# Patient Record
Sex: FEMALE | Race: WHITE | Employment: UNEMPLOYED | URBAN - METROPOLITAN AREA
[De-identification: names, ages, dates, MRNs, and addresses within clinical notes are randomized per-mention and may not be internally consistent; named-entity substitution may affect disease eponyms.]

---

## 2024-11-22 ENCOUNTER — APPOINTMENT (OUTPATIENT)
Dept: CT IMAGING | Age: 29
End: 2024-11-22
Payer: MEDICAID

## 2024-11-22 ENCOUNTER — HOSPITAL ENCOUNTER (OUTPATIENT)
Age: 29
Setting detail: OBSERVATION
Discharge: HOME | End: 2024-11-22
Attending: STUDENT IN AN ORGANIZED HEALTH CARE EDUCATION/TRAINING PROGRAM | Admitting: INTERNAL MEDICINE
Payer: MEDICAID

## 2024-11-22 VITALS
BODY MASS INDEX: 16.8 KG/M2 | HEIGHT: 71 IN | TEMPERATURE: 97.7 F | OXYGEN SATURATION: 97 % | WEIGHT: 120 LBS | RESPIRATION RATE: 16 BRPM | SYSTOLIC BLOOD PRESSURE: 96 MMHG | HEART RATE: 90 BPM | DIASTOLIC BLOOD PRESSURE: 74 MMHG

## 2024-11-22 DIAGNOSIS — R10.84 GENERALIZED ABDOMINAL PAIN: ICD-10-CM

## 2024-11-22 DIAGNOSIS — E87.6 HYPOKALEMIA: Primary | ICD-10-CM

## 2024-11-22 DIAGNOSIS — R11.0 NAUSEA: ICD-10-CM

## 2024-11-22 PROBLEM — F19.10 POLYSUBSTANCE ABUSE (HCC): Status: ACTIVE | Noted: 2024-11-22

## 2024-11-22 PROBLEM — F10.229 ACUTE ALCOHOL INTOXICATION WITH ALCOHOLISM (HCC): Status: ACTIVE | Noted: 2024-11-22

## 2024-11-22 PROBLEM — K58.9 IRRITABLE BOWEL SYNDROME: Status: ACTIVE | Noted: 2024-11-22

## 2024-11-22 LAB
ALBUMIN SERPL-MCNC: 4.5 G/DL (ref 3.5–5.2)
ALBUMIN/GLOB SERPL: 1.1 {RATIO} (ref 1–2.5)
ALP SERPL-CCNC: 96 U/L (ref 35–104)
ALT SERPL-CCNC: 8 U/L (ref 10–35)
AMPHET UR QL SCN: NEGATIVE
ANION GAP SERPL CALCULATED.3IONS-SCNC: 16 MMOL/L (ref 9–16)
AST SERPL-CCNC: 24 U/L (ref 10–35)
BARBITURATES UR QL SCN: NEGATIVE
BASOPHILS # BLD: 0.05 K/UL (ref 0–0.2)
BASOPHILS NFR BLD: 1 % (ref 0–2)
BENZODIAZ UR QL: NEGATIVE
BILIRUB SERPL-MCNC: 0.4 MG/DL (ref 0–1.2)
BILIRUB UR QL STRIP: NEGATIVE
BUN SERPL-MCNC: 7 MG/DL (ref 6–20)
CALCIUM SERPL-MCNC: 10.9 MG/DL (ref 8.6–10.4)
CANNABINOIDS UR QL SCN: POSITIVE
CHLORIDE SERPL-SCNC: 80 MMOL/L (ref 98–107)
CLARITY UR: ABNORMAL
CO2 SERPL-SCNC: 37 MMOL/L (ref 20–31)
COCAINE UR QL SCN: NEGATIVE
COLOR UR: YELLOW
CREAT SERPL-MCNC: 0.9 MG/DL (ref 0.5–0.9)
EOSINOPHIL # BLD: <0.03 K/UL (ref 0–0.44)
EOSINOPHILS RELATIVE PERCENT: 0 % (ref 1–4)
EPI CELLS #/AREA URNS HPF: NORMAL /HPF (ref 0–5)
ERYTHROCYTE [DISTWIDTH] IN BLOOD BY AUTOMATED COUNT: 13.8 % (ref 11.8–14.4)
ETHANOL PERCENT: 0.16 %
ETHANOLAMINE SERPL-MCNC: 163 MG/DL (ref 0–0.08)
FENTANYL UR QL: NEGATIVE
GFR, ESTIMATED: >90 ML/MIN/1.73M2
GLUCOSE SERPL-MCNC: 96 MG/DL (ref 74–99)
GLUCOSE UR STRIP-MCNC: NEGATIVE MG/DL
HCG SERPL QL: NEGATIVE
HCT VFR BLD AUTO: 42.7 % (ref 36.3–47.1)
HGB BLD-MCNC: 15.2 G/DL (ref 11.9–15.1)
HGB UR QL STRIP.AUTO: NEGATIVE
IMM GRANULOCYTES # BLD AUTO: 0.01 K/UL (ref 0–0.3)
IMM GRANULOCYTES NFR BLD: 0 %
KETONES UR STRIP-MCNC: NEGATIVE MG/DL
LEUKOCYTE ESTERASE UR QL STRIP: ABNORMAL
LIPASE SERPL-CCNC: 34 U/L (ref 13–60)
LYMPHOCYTES NFR BLD: 3.72 K/UL (ref 1.1–3.7)
LYMPHOCYTES RELATIVE PERCENT: 60 % (ref 24–43)
MAGNESIUM SERPL-MCNC: 2 MG/DL (ref 1.6–2.6)
MCH RBC QN AUTO: 29.7 PG (ref 25.2–33.5)
MCHC RBC AUTO-ENTMCNC: 35.6 G/DL (ref 28.4–34.8)
MCV RBC AUTO: 83.4 FL (ref 82.6–102.9)
METHADONE UR QL: NEGATIVE
MONOCYTES NFR BLD: 0.69 K/UL (ref 0.1–1.2)
MONOCYTES NFR BLD: 11 % (ref 3–12)
NEUTROPHILS NFR BLD: 28 % (ref 36–65)
NEUTS SEG NFR BLD: 1.7 K/UL (ref 1.5–8.1)
NITRITE UR QL STRIP: NEGATIVE
NRBC BLD-RTO: 0 PER 100 WBC
OPIATES UR QL SCN: NEGATIVE
OXYCODONE UR QL SCN: NEGATIVE
PCP UR QL SCN: NEGATIVE
PH UR STRIP: 7.5 [PH] (ref 5–8)
PLATELET # BLD AUTO: 360 K/UL (ref 138–453)
PMV BLD AUTO: 8.6 FL (ref 8.1–13.5)
POTASSIUM SERPL-SCNC: 2.3 MMOL/L (ref 3.7–5.3)
POTASSIUM SERPL-SCNC: 2.8 MMOL/L (ref 3.7–5.3)
PROT SERPL-MCNC: 8.6 G/DL (ref 6.6–8.7)
PROT UR STRIP-MCNC: NEGATIVE MG/DL
RBC # BLD AUTO: 5.12 M/UL (ref 3.95–5.11)
RBC #/AREA URNS HPF: NORMAL /HPF (ref 0–2)
SODIUM SERPL-SCNC: 133 MMOL/L (ref 136–145)
SP GR UR STRIP: 1.01 (ref 1–1.03)
TEST INFORMATION: ABNORMAL
UROBILINOGEN UR STRIP-ACNC: NORMAL EU/DL (ref 0–1)
WBC #/AREA URNS HPF: NORMAL /HPF (ref 0–5)
WBC OTHER # BLD: 6.2 K/UL (ref 3.5–11.3)

## 2024-11-22 PROCEDURE — APPSS45 APP SPLIT SHARED TIME 31-45 MINUTES

## 2024-11-22 PROCEDURE — 6370000000 HC RX 637 (ALT 250 FOR IP): Performed by: INTERNAL MEDICINE

## 2024-11-22 PROCEDURE — 6360000002 HC RX W HCPCS: Performed by: STUDENT IN AN ORGANIZED HEALTH CARE EDUCATION/TRAINING PROGRAM

## 2024-11-22 PROCEDURE — 80307 DRUG TEST PRSMV CHEM ANLYZR: CPT

## 2024-11-22 PROCEDURE — G0480 DRUG TEST DEF 1-7 CLASSES: HCPCS

## 2024-11-22 PROCEDURE — 99285 EMERGENCY DEPT VISIT HI MDM: CPT

## 2024-11-22 PROCEDURE — 84132 ASSAY OF SERUM POTASSIUM: CPT

## 2024-11-22 PROCEDURE — 96368 THER/DIAG CONCURRENT INF: CPT

## 2024-11-22 PROCEDURE — 99222 1ST HOSP IP/OBS MODERATE 55: CPT | Performed by: INTERNAL MEDICINE

## 2024-11-22 PROCEDURE — 96366 THER/PROPH/DIAG IV INF ADDON: CPT

## 2024-11-22 PROCEDURE — 96365 THER/PROPH/DIAG IV INF INIT: CPT

## 2024-11-22 PROCEDURE — G0378 HOSPITAL OBSERVATION PER HR: HCPCS

## 2024-11-22 PROCEDURE — 85025 COMPLETE CBC W/AUTO DIFF WBC: CPT

## 2024-11-22 PROCEDURE — 6360000002 HC RX W HCPCS

## 2024-11-22 PROCEDURE — 80053 COMPREHEN METABOLIC PANEL: CPT

## 2024-11-22 PROCEDURE — 81001 URINALYSIS AUTO W/SCOPE: CPT

## 2024-11-22 PROCEDURE — 2580000003 HC RX 258: Performed by: STUDENT IN AN ORGANIZED HEALTH CARE EDUCATION/TRAINING PROGRAM

## 2024-11-22 PROCEDURE — 96375 TX/PRO/DX INJ NEW DRUG ADDON: CPT

## 2024-11-22 PROCEDURE — 74176 CT ABD & PELVIS W/O CONTRAST: CPT

## 2024-11-22 PROCEDURE — 83735 ASSAY OF MAGNESIUM: CPT

## 2024-11-22 PROCEDURE — 36415 COLL VENOUS BLD VENIPUNCTURE: CPT

## 2024-11-22 PROCEDURE — 84703 CHORIONIC GONADOTROPIN ASSAY: CPT

## 2024-11-22 PROCEDURE — 83690 ASSAY OF LIPASE: CPT

## 2024-11-22 RX ORDER — MAGNESIUM SULFATE 1 G/100ML
1000 INJECTION INTRAVENOUS ONCE
Status: COMPLETED | OUTPATIENT
Start: 2024-11-22 | End: 2024-11-22

## 2024-11-22 RX ORDER — SODIUM CHLORIDE, SODIUM LACTATE, POTASSIUM CHLORIDE, AND CALCIUM CHLORIDE .6; .31; .03; .02 G/100ML; G/100ML; G/100ML; G/100ML
500 INJECTION, SOLUTION INTRAVENOUS ONCE
Status: COMPLETED | OUTPATIENT
Start: 2024-11-22 | End: 2024-11-22

## 2024-11-22 RX ORDER — MAGNESIUM SULFATE 1 G/100ML
1000 INJECTION INTRAVENOUS PRN
Status: DISCONTINUED | OUTPATIENT
Start: 2024-11-22 | End: 2024-11-22 | Stop reason: HOSPADM

## 2024-11-22 RX ORDER — METOCLOPRAMIDE HYDROCHLORIDE 5 MG/ML
10 INJECTION INTRAMUSCULAR; INTRAVENOUS ONCE
Status: COMPLETED | OUTPATIENT
Start: 2024-11-22 | End: 2024-11-22

## 2024-11-22 RX ORDER — FOLIC ACID 1 MG/1
1 TABLET ORAL DAILY
Status: DISCONTINUED | OUTPATIENT
Start: 2024-11-22 | End: 2024-11-22 | Stop reason: HOSPADM

## 2024-11-22 RX ORDER — POTASSIUM CHLORIDE 1500 MG/1
40 TABLET, EXTENDED RELEASE ORAL EVERY 4 HOURS
Status: COMPLETED | OUTPATIENT
Start: 2024-11-22 | End: 2024-11-22

## 2024-11-22 RX ORDER — SODIUM CHLORIDE AND POTASSIUM CHLORIDE 300; 900 MG/100ML; MG/100ML
INJECTION, SOLUTION INTRAVENOUS CONTINUOUS
Status: DISPENSED | OUTPATIENT
Start: 2024-11-22 | End: 2024-11-22

## 2024-11-22 RX ORDER — ENOXAPARIN SODIUM 100 MG/ML
40 INJECTION SUBCUTANEOUS DAILY
Status: DISCONTINUED | OUTPATIENT
Start: 2024-11-22 | End: 2024-11-22 | Stop reason: HOSPADM

## 2024-11-22 RX ORDER — FAMOTIDINE 20 MG/1
20 TABLET, FILM COATED ORAL 2 TIMES DAILY
COMMUNITY
Start: 2024-11-15 | End: 2025-11-15

## 2024-11-22 RX ORDER — DIPHENHYDRAMINE HYDROCHLORIDE 50 MG/ML
25 INJECTION INTRAMUSCULAR; INTRAVENOUS ONCE
Status: COMPLETED | OUTPATIENT
Start: 2024-11-22 | End: 2024-11-22

## 2024-11-22 RX ORDER — METOCLOPRAMIDE HYDROCHLORIDE 5 MG/ML
5 INJECTION INTRAMUSCULAR; INTRAVENOUS EVERY 6 HOURS PRN
Status: DISCONTINUED | OUTPATIENT
Start: 2024-11-22 | End: 2024-11-22 | Stop reason: HOSPADM

## 2024-11-22 RX ORDER — POTASSIUM CHLORIDE 1500 MG/1
20 TABLET, EXTENDED RELEASE ORAL 2 TIMES DAILY
COMMUNITY

## 2024-11-22 RX ORDER — SODIUM CHLORIDE AND POTASSIUM CHLORIDE 300; 900 MG/100ML; MG/100ML
INJECTION, SOLUTION INTRAVENOUS CONTINUOUS
Status: DISCONTINUED | OUTPATIENT
Start: 2024-11-22 | End: 2024-11-22 | Stop reason: HOSPADM

## 2024-11-22 RX ORDER — POLYETHYLENE GLYCOL 3350 17 G/17G
17 POWDER, FOR SOLUTION ORAL DAILY PRN
Status: DISCONTINUED | OUTPATIENT
Start: 2024-11-22 | End: 2024-11-22 | Stop reason: HOSPADM

## 2024-11-22 RX ORDER — ACETAMINOPHEN 650 MG/1
650 SUPPOSITORY RECTAL EVERY 6 HOURS PRN
Status: DISCONTINUED | OUTPATIENT
Start: 2024-11-22 | End: 2024-11-22 | Stop reason: HOSPADM

## 2024-11-22 RX ORDER — ONDANSETRON 4 MG/1
4 TABLET, ORALLY DISINTEGRATING ORAL EVERY 8 HOURS PRN
Status: DISCONTINUED | OUTPATIENT
Start: 2024-11-22 | End: 2024-11-22 | Stop reason: HOSPADM

## 2024-11-22 RX ORDER — M-VIT,TX,IRON,MINS/CALC/FOLIC 27MG-0.4MG
1 TABLET ORAL DAILY
COMMUNITY
Start: 2024-11-22 | End: 2025-11-22

## 2024-11-22 RX ORDER — ONDANSETRON 2 MG/ML
4 INJECTION INTRAMUSCULAR; INTRAVENOUS EVERY 6 HOURS PRN
Status: DISCONTINUED | OUTPATIENT
Start: 2024-11-22 | End: 2024-11-22 | Stop reason: HOSPADM

## 2024-11-22 RX ORDER — OLANZAPINE 5 MG/1
5 TABLET ORAL NIGHTLY
COMMUNITY
Start: 2024-11-15 | End: 2025-11-15

## 2024-11-22 RX ORDER — IOPAMIDOL 755 MG/ML
75 INJECTION, SOLUTION INTRAVASCULAR
Status: DISCONTINUED | OUTPATIENT
Start: 2024-11-22 | End: 2024-11-22 | Stop reason: HOSPADM

## 2024-11-22 RX ORDER — SODIUM CHLORIDE 0.9 % (FLUSH) 0.9 %
10 SYRINGE (ML) INJECTION PRN
Status: DISCONTINUED | OUTPATIENT
Start: 2024-11-22 | End: 2024-11-22 | Stop reason: HOSPADM

## 2024-11-22 RX ORDER — 0.9 % SODIUM CHLORIDE 0.9 %
80 INTRAVENOUS SOLUTION INTRAVENOUS ONCE
Status: DISCONTINUED | OUTPATIENT
Start: 2024-11-22 | End: 2024-11-22 | Stop reason: HOSPADM

## 2024-11-22 RX ORDER — ACETAMINOPHEN 325 MG/1
650 TABLET ORAL EVERY 6 HOURS PRN
Status: DISCONTINUED | OUTPATIENT
Start: 2024-11-22 | End: 2024-11-22 | Stop reason: HOSPADM

## 2024-11-22 RX ORDER — SODIUM CHLORIDE 0.9 % (FLUSH) 0.9 %
5-40 SYRINGE (ML) INJECTION EVERY 12 HOURS SCHEDULED
Status: DISCONTINUED | OUTPATIENT
Start: 2024-11-22 | End: 2024-11-22 | Stop reason: HOSPADM

## 2024-11-22 RX ORDER — GAUZE BANDAGE 2" X 2"
100 BANDAGE TOPICAL DAILY
Status: DISCONTINUED | OUTPATIENT
Start: 2024-11-22 | End: 2024-11-22 | Stop reason: HOSPADM

## 2024-11-22 RX ORDER — SODIUM CHLORIDE 9 MG/ML
INJECTION, SOLUTION INTRAVENOUS PRN
Status: DISCONTINUED | OUTPATIENT
Start: 2024-11-22 | End: 2024-11-22 | Stop reason: HOSPADM

## 2024-11-22 RX ORDER — MAGNESIUM OXIDE 400 MG/1
400 TABLET ORAL DAILY
COMMUNITY
Start: 2024-11-15

## 2024-11-22 RX ADMIN — POTASSIUM CHLORIDE 40 MEQ: 1500 TABLET, EXTENDED RELEASE ORAL at 13:56

## 2024-11-22 RX ADMIN — MAGNESIUM SULFATE HEPTAHYDRATE 1000 MG: 1 INJECTION, SOLUTION INTRAVENOUS at 03:33

## 2024-11-22 RX ADMIN — METOCLOPRAMIDE 10 MG: 5 INJECTION, SOLUTION INTRAMUSCULAR; INTRAVENOUS at 02:26

## 2024-11-22 RX ADMIN — FOLIC ACID 1 MG: 1 TABLET ORAL at 13:55

## 2024-11-22 RX ADMIN — POTASSIUM CHLORIDE AND SODIUM CHLORIDE: 900; 300 INJECTION, SOLUTION INTRAVENOUS at 03:32

## 2024-11-22 RX ADMIN — POTASSIUM CHLORIDE AND SODIUM CHLORIDE: 900; 300 INJECTION, SOLUTION INTRAVENOUS at 09:38

## 2024-11-22 RX ADMIN — DIPHENHYDRAMINE HYDROCHLORIDE 25 MG: 50 INJECTION INTRAMUSCULAR; INTRAVENOUS at 02:26

## 2024-11-22 RX ADMIN — Medication 100 MG: at 13:55

## 2024-11-22 RX ADMIN — POTASSIUM CHLORIDE 40 MEQ: 1500 TABLET, EXTENDED RELEASE ORAL at 10:26

## 2024-11-22 RX ADMIN — SODIUM CHLORIDE, POTASSIUM CHLORIDE, SODIUM LACTATE AND CALCIUM CHLORIDE 500 ML: 600; 310; 30; 20 INJECTION, SOLUTION INTRAVENOUS at 06:16

## 2024-11-22 ASSESSMENT — PAIN SCALES - GENERAL
PAINLEVEL_OUTOF10: 8
PAINLEVEL_OUTOF10: 0
PAINLEVEL_OUTOF10: 2

## 2024-11-22 ASSESSMENT — ENCOUNTER SYMPTOMS
NAUSEA: 1
SHORTNESS OF BREATH: 0
ABDOMINAL PAIN: 1
WHEEZING: 0
CONSTIPATION: 0
VOMITING: 0

## 2024-11-22 ASSESSMENT — PAIN - FUNCTIONAL ASSESSMENT: PAIN_FUNCTIONAL_ASSESSMENT: 0-10

## 2024-11-22 NOTE — PROGRESS NOTES
Patient admitted to room 1009  VS taken, telemetry placed and call light given/within reach. Patient A&O and given room orientation. Orders released/reviewed, initial assessment completed and navigator started. See chart for more detail.

## 2024-11-22 NOTE — PROGRESS NOTES
Patient called out at 1500 and stated she would like to be discharged. Writer informed patient that her potassium was critically low and next lab draw would be at 1600. Patient expressed that she and her significant other would not like to wait. Writer explained if she left it would be against medical advice. Patient expressed understanding and signed AMA paperwork    1600- Dr Mejía to bedside, after discussion with patient discharge order in place. Patient discharged, picked up via [employer]

## 2024-11-22 NOTE — ED PROVIDER NOTES
admission.    DATA FOR LAB AND RADIOLOGY TESTS ORDERED BELOW ARE REVIEWED BY THE ED CLINICIAN:    RADIOLOGY: All x-rays, CT, MRI, and formal ultrasound images (except ED bedside ultrasound) are read by the radiologist, see reports below, unless otherwise noted in MDM or here.  Reports below are reviewed by myself.  CT ABDOMEN PELVIS WO CONTRAST Additional Contrast? None   Final Result   No acute inflammatory process identified in the abdomen or pelvis.             LABS: Lab orders shown below, the results are reviewed by myself, and all abnormals are listed below.  Labs Reviewed   COMPREHENSIVE METABOLIC PANEL - Abnormal; Notable for the following components:       Result Value    Sodium 133 (*)     Potassium 2.3 (*)     Chloride 80 (*)     CO2 37 (*)     Calcium 10.9 (*)     ALT 8 (*)     All other components within normal limits   ETHANOL - Abnormal; Notable for the following components:    Ethanol Lvl 163 (*)     All other components within normal limits   CBC WITH AUTO DIFFERENTIAL - Abnormal; Notable for the following components:    RBC 5.12 (*)     Hemoglobin 15.2 (*)     MCHC 35.6 (*)     Neutrophils % 28 (*)     Lymphocytes % 60 (*)     Eosinophils % 0 (*)     Lymphocytes Absolute 3.72 (*)     All other components within normal limits   URINE DRUG SCREEN - Abnormal; Notable for the following components:    Cannabinoid Scrn, Ur POSITIVE (*)     All other components within normal limits   URINALYSIS WITH REFLEX TO CULTURE - Abnormal; Notable for the following components:    Turbidity UA SLIGHTLY CLOUDY (*)     Leukocyte Esterase, Urine TRACE (*)     All other components within normal limits   LIPASE   HCG, SERUM, QUALITATIVE   MAGNESIUM   MICROSCOPIC URINALYSIS   POTASSIUM       Vitals Reviewed:    Vitals:    11/22/24 0433 11/22/24 0503 11/22/24 0704 11/22/24 0730   BP:   (!) 88/67 108/74   Pulse:    69   Resp:    16   Temp:    98.6 °F (37 °C)   TempSrc:    Oral   SpO2: 96% 98% 95% 97%   Weight:       Height:          MEDICATIONS GIVEN TO PATIENT THIS ENCOUNTER:  Orders Placed This Encounter   Medications    metoclopramide (REGLAN) injection 10 mg    diphenhydrAMINE (BENADRYL) injection 25 mg    0.9% NaCl with KCl 40 mEq infusion    magnesium sulfate 1000 mg in dextrose 5% 100 mL IVPB    iopamidol (ISOVUE-370) 76 % injection 75 mL    sodium chloride flush 0.9 % injection 10 mL    sodium chloride 0.9 % bolus 80 mL    sodium chloride flush 0.9 % injection 5-40 mL    sodium chloride flush 0.9 % injection 10 mL    0.9 % sodium chloride infusion    magnesium sulfate 1000 mg in dextrose 5% 100 mL IVPB    enoxaparin (LOVENOX) injection 40 mg     Order Specific Question:   Indication of Use     Answer:   Prophylaxis-DVT/PE    OR Linked Order Group     ondansetron (ZOFRAN-ODT) disintegrating tablet 4 mg     ondansetron (ZOFRAN) injection 4 mg    polyethylene glycol (GLYCOLAX) packet 17 g    OR Linked Order Group     acetaminophen (TYLENOL) tablet 650 mg     acetaminophen (TYLENOL) suppository 650 mg    0.9% NaCl with KCl 40 mEq infusion    metoclopramide (REGLAN) injection 5 mg    lactated ringers bolus 500 mL     DISCHARGE PRESCRIPTIONS:  Current Discharge Medication List        PHYSICIAN CONSULTS ORDERED THIS ENCOUNTER:  IP CONSULT TO HOSPITALIST    ED Course Notes From Epic Narrator:  ED Course as of 11/22/24 0823 Fri Nov 22, 2024   0250 RDW: 13.8 [MS]   0311 Potassium(!!): 2.3 [MS]      ED Course User Index  [MS] Gokul Salomon DO         CRITICAL CARE:   Due to the high probability of sudden and clinically significant deterioration in the patient's condition patient required highest level of my preparedness to intervene urgently. I provided critical care time including documentation time, medication orders and management, reevaluation, vital sign assessment, ordering and reviewing of of lab tests ordering and reviewing of x-ray studies, and admission orders. Aggregate critical care time is 30 minutes including only time

## 2024-11-22 NOTE — CARE COORDINATION
Case Management Assessment  Initial Evaluation    Date/Time of Evaluation: 11/22/2024 1:19 PM  Assessment Completed by: CARO LEMUS RN    If patient is discharged prior to next notation, then this note serves as note for discharge by case management.    Patient Name: Ese Harding                   YOB: 1995  Diagnosis: Hypokalemia [E87.6]  Nausea [R11.0]  Generalized abdominal pain [R10.84]                   Date / Time: 11/22/2024  2:14 AM    Patient Admission Status: Observation   Readmission Risk (Low < 19, Mod (19-27), High > 27): No data recorded  Current PCP: Wale Zhang MD  PCP verified by CM? Yes    Chart Reviewed: Yes      History Provided by: Patient  Patient Orientation: Alert and Oriented    Patient Cognition: Alert    Hospitalization in the last 30 days (Readmission):  No    If yes, Readmission Assessment in  Navigator will be completed.    Advance Directives:      Code Status: Full Code   Patient's Primary Decision Maker is: Legal Next of Kin      Discharge Planning:    Patient lives with: Spouse/Significant Other Type of Home: House  Primary Care Giver: Self  Patient Support Systems include: Spouse/Significant Other   Current Financial resources: [place] ([place])  Current community resources: [facility]  Current services prior to admission: None            Current DME:              Type of Home Care services:  None    ADLS  Prior functional level: Independent in ADLs/IADLs  Current functional level: Independent in ADLs/IADLs    PT AM-PAC:   /24  OT AM-PAC:   /24    Family can provide assistance at DC: Yes  Would you like Case Management to discuss the discharge plan with any other family members/significant others, and if so, who? Yes  Plans to Return to Present Housing: Yes  Other Identified Issues/Barriers to RETURNING to current housing: labs better   Potential Assistance needed at discharge: N/A            Potential DME:    Patient expects to discharge to:

## 2024-11-22 NOTE — H&P
[facility]  Office: [phone]  Ignacio Marc DO, Howard Jaquez DO, Frank Mejía DO, Néstor Rizvi DO, Davina Ling MD, Melody Andrade MD, Elayne Alves MD, Pooja Younger MD,  Richard Rendon MD, Ousmane Almanza MD, Christiana Webb MD,  Elizabeth Cardona DO, Polina Hoskins MD, Shane Carranza MD, Tung Marc DO, Dominique Lowery MD,  Walter Benjamin DO, Damaris Dumont MD, Tara Verde MD, Keke Wu MD, Tayo Knox MD,  Tano Martinez MD, Cyndee Trivedi MD, Lilliam Reyes MD, Devon Michelle MD, Ty Martin MD, Alessandro Zaragoza MD, Griffin Horton DO, Ethan Rowe DO, Kelsie Samayoa MD,  Adrian Garcia MD, Shirley Waterhouse, CNP,  Lorrie Porter, CNP, Juwan Pfeiffer, CNP,  Betty Couch, KITTY, Afsaneh Trujillo, CNP, Senait Sena, CNP, Dena Stevens CNP, Sharri Campbell CNP, Shawanda Montejo, CNP, Darleen Mccauley, PA-C, Mariana Hemphill PA-C, Sophie Ponce, CNP, Brie Nunn, CNP, Elaine Cheatham, CNP, Johanna Sanders, CNS, Alesia Coto, CNP, Marisol Alicea, CNP, Tracy Schwab, CNP         [facility]   IN-PATIENT SERVICE   [facility]    HISTORY AND PHYSICAL EXAMINATION            Date:   11/22/2024  Patient name:  Ese Harding  Date of admission:  11/22/2024  2:14 AM  MRN:   [id number]  Account:  [id number]  YOB: 1995  PCP:    No primary care provider on file.  Room:   [place] [address]  Code Status:    No Order    Chief Complaint:     Chief Complaint   Patient presents with    Abdominal Pain    Vomiting       History Obtained From:     patient, electronic medical record    History of Present Illness:     Ese Harding is a 29 y.o. Non- of /a female who presents with Abdominal Pain and Vomiting   and is admitted to the hospital for the management of Hypokalemia.    Patient presents emergency department with complaints of abdominal pain and vomiting.  She does have a past medical history significant for irritable bowel syndrome, hypokalemia,  BMI 16.74 kg/m²   No data recorded.    No results for input(s): \"POCGLU\" in the last 72 hours.  No intake or output data in the 24 hours ending 11/22/24 0427    Physical Exam  Constitutional:       General: She is sleeping.      Comments: Intoxicated   Cardiovascular:      Rate and Rhythm: Normal rate and regular rhythm.      Heart sounds: Normal heart sounds.   Pulmonary:      Effort: Pulmonary effort is normal.      Breath sounds: Normal breath sounds and air entry.   Abdominal:      Palpations: Abdomen is soft.      Tenderness: There is no abdominal tenderness.   Musculoskeletal:      Right lower leg: No edema.      Left lower leg: No edema.   Skin:     General: Skin is warm and dry.      Capillary Refill: Capillary refill takes 2 to 3 seconds.   Neurological:      Mental Status: She is oriented to person, place, and time and easily aroused.   Psychiatric:         Mood and Affect: Mood normal.         Behavior: Behavior is cooperative.         Investigations:      Laboratory Testing:  Recent Results (from the past 24 hour(s))   Comprehensive Metabolic Panel    Collection Time: 11/22/24  2:22 AM   Result Value Ref Range    Sodium 133 (L) 136 - 145 mmol/L    Potassium 2.3 (LL) 3.7 - 5.3 mmol/L    Chloride 80 (L) 98 - 107 mmol/L    CO2 37 (H) 20 - 31 mmol/L    Anion Gap 16 9 - 16 mmol/L    Glucose 96 74 - 99 mg/dL    BUN 7 6 - 20 mg/dL    Creatinine 0.9 0.50 - 0.90 mg/dL    Est, Glom Filt Rate >90 >60 mL/min/1.73m2    Calcium 10.9 (H) 8.6 - 10.4 mg/dL    Total Protein 8.6 6.6 - 8.7 g/dL    Albumin 4.5 3.5 - 5.2 g/dL    Albumin/Globulin Ratio 1.1 1.0 - 2.5    Total Bilirubin 0.4 0.00 - 1.20 mg/dL    Alkaline Phosphatase 96 35 - 104 U/L    ALT 8 (L) 10 - 35 U/L    AST 24 10 - 35 U/L   Lipase    Collection Time: 11/22/24  2:22 AM   Result Value Ref Range    Lipase 34 13 - 60 U/L   Ethanol    Collection Time: 11/22/24  2:22 AM   Result Value Ref Range    Ethanol Lvl 163 (H) <10 mg/dL    Ethanol percent 0.163 %   CBC

## 2024-11-22 NOTE — PROGRESS NOTES
[x] Medication Reconciliation was completed and the patient's home medication list was verified. The Med List Status is \"Complete\". The following sources were used to assist with Medication Reconciliation:    [] Med Rec Pharmacist already completed   [x] Patient had a list of medications which was transcribed into the EHR.  [] Patient provided bottles of their medications  [] Home medications reviewed and confirmed with   [] Contacted patient's pharmacy to confirm home medications  [] Contacted patient's physician office to confirm home medications  [x] Medical Records from another facility and/or Care Everywhere were reviewed  [] MAR from facility requested to be faxed over  [] Unable to complete due to patient condition  [] Unable to validate home medications      [] There are one or more home medications that need clarification before Medication Reconciliation can be completed. The Med List Status has been marked as In Progress.     To assist with Home Medication Reconciliation the following actions have been taken:    [] Pharmacy medication reconciliation service requested. (Note: This can be done by sending a Perfect Serve message to The Med Rec Pharmacist or by phoning [phone].)  [] Family requested to bring medications into the hospital  [] Family requested to call hospital with medication list  [] Message left with physician office  [] Request for medical records made to   [] Other

## 2024-11-22 NOTE — DISCHARGE SUMMARY
of abdominal pain with intractable vomiting.  Upon evaluation she is found to have marked hypokalemia the potassium of 2.3 and mild dehydration.  She is admitted and placed on IV fluids and underwent aggressive potassium supplementation.  On the date of the 22nd and her diet was advanced and tolerated and subsequently she was able to be discharged home after completion of her oral potassium supplementation.      Significant therapeutic interventions: As above    Significant Diagnostic Studies:   Labs / Micro:  CBC:   Lab Results   Component Value Date/Time    WBC 6.2 11/22/2024 02:22 AM    RBC 5.12 11/22/2024 02:22 AM    HGB 15.2 11/22/2024 02:22 AM    HCT 42.7 11/22/2024 02:22 AM    MCV 83.4 11/22/2024 02:22 AM    MCH 29.7 11/22/2024 02:22 AM    MCHC 35.6 11/22/2024 02:22 AM    RDW 13.8 11/22/2024 02:22 AM     11/22/2024 02:22 AM     BMP:    Lab Results   Component Value Date/Time    GLUCOSE 96 11/22/2024 02:22 AM     11/22/2024 02:22 AM    K 2.8 11/22/2024 08:56 AM    CL 80 11/22/2024 02:22 AM    CO2 37 11/22/2024 02:22 AM    ANIONGAP 16 11/22/2024 02:22 AM    BUN 7 11/22/2024 02:22 AM    CREATININE 0.9 11/22/2024 02:22 AM    CALCIUM 10.9 11/22/2024 02:22 AM    LABGLOM >90 11/22/2024 02:22 AM        Radiology:  CT ABDOMEN PELVIS WO CONTRAST Additional Contrast? None    Result Date: 11/22/2024  No acute inflammatory process identified in the abdomen or pelvis.       Consultations:    Consults:     Final Specialist Recommendations/Findings:   IP CONSULT TO HOSPITALIST      The patient was seen and examined on day of discharge and this discharge summary is in conjunction with any daily progress note from day of discharge.    Discharge plan:     Disposition: Home    Physician Follow Up:   PCP 1 week  No follow-up provider specified.     Requiring Further Evaluation/Follow Up POST HOSPITALIZATION/Incidental Findings: Follow-up labs at discretion of PCP    Diet: regular diet    Activity: As  tolerated    Instructions to Patient: Take medication as prescribed    Discharge Medications:      Medication List        START taking these medications      therapeutic multivitamin-minerals tablet  Take 1 tablet by mouth daily            CONTINUE taking these medications      famotidine 20 MG tablet  Commonly known as: PEPCID     linaclotide 145 MCG capsule  Commonly known as: LINZESS     magnesium oxide 400 MG tablet  Commonly known as: MAG-OX     OLANZapine 5 MG tablet  Commonly known as: ZYPREXA     omeprazole 20 MG delayed release capsule  Commonly known as: PRILOSEC     potassium chloride 20 MEQ extended release tablet  Commonly known as: KLOR-CON M               Where to Get Your Medications        You can get these medications from any pharmacy    You don't need a prescription for these medications  therapeutic multivitamin-minerals tablet         No discharge procedures on file.    Time Spent on discharge is   26 minutes  in patient examination, evaluation, counseling as well as medication reconciliation, prescriptions for required medications, discharge plan and follow up.    Electronically signed by   Frank Mejía DO  11/22/2024  4:04 PM      Thank you Wale Lacy MD for the opportunity to be involved in this patient's care.

## 2025-01-01 ENCOUNTER — HOSPITAL ENCOUNTER (EMERGENCY)
Age: 30
Discharge: HOME | End: 2025-01-02
Attending: STUDENT IN AN ORGANIZED HEALTH CARE EDUCATION/TRAINING PROGRAM
Payer: MEDICAID

## 2025-01-01 DIAGNOSIS — F10.920 ACUTE ALCOHOLIC INTOXICATION WITHOUT COMPLICATION (HCC): ICD-10-CM

## 2025-01-01 DIAGNOSIS — N30.00 ACUTE CYSTITIS WITHOUT HEMATURIA: Primary | ICD-10-CM

## 2025-01-01 PROCEDURE — 85025 COMPLETE CBC W/AUTO DIFF WBC: CPT

## 2025-01-01 PROCEDURE — 99284 EMERGENCY DEPT VISIT MOD MDM: CPT

## 2025-01-01 PROCEDURE — 84484 ASSAY OF TROPONIN QUANT: CPT

## 2025-01-01 PROCEDURE — 96374 THER/PROPH/DIAG INJ IV PUSH: CPT

## 2025-01-01 PROCEDURE — 80048 BASIC METABOLIC PNL TOTAL CA: CPT

## 2025-01-01 PROCEDURE — G0480 DRUG TEST DEF 1-7 CLASSES: HCPCS

## 2025-01-01 PROCEDURE — 6360000002 HC RX W HCPCS: Performed by: STUDENT IN AN ORGANIZED HEALTH CARE EDUCATION/TRAINING PROGRAM

## 2025-01-01 PROCEDURE — 93005 ELECTROCARDIOGRAM TRACING: CPT | Performed by: STUDENT IN AN ORGANIZED HEALTH CARE EDUCATION/TRAINING PROGRAM

## 2025-01-01 RX ORDER — ONDANSETRON 2 MG/ML
4 INJECTION INTRAMUSCULAR; INTRAVENOUS ONCE
Status: COMPLETED | OUTPATIENT
Start: 2025-01-01 | End: 2025-01-01

## 2025-01-01 RX ADMIN — ONDANSETRON 4 MG: 2 INJECTION, SOLUTION INTRAMUSCULAR; INTRAVENOUS at 23:31

## 2025-01-01 ASSESSMENT — PAIN - FUNCTIONAL ASSESSMENT: PAIN_FUNCTIONAL_ASSESSMENT: 0-10

## 2025-01-01 ASSESSMENT — PAIN DESCRIPTION - LOCATION: LOCATION: CHEST

## 2025-01-01 ASSESSMENT — PAIN SCALES - GENERAL: PAINLEVEL_OUTOF10: 7

## 2025-01-02 ENCOUNTER — HOSPITAL ENCOUNTER (EMERGENCY)
Age: 30
Discharge: LEFT BEFORE DISCHARGE | End: 2025-01-02

## 2025-01-02 VITALS
DIASTOLIC BLOOD PRESSURE: 86 MMHG | RESPIRATION RATE: 29 BRPM | BODY MASS INDEX: 15.4 KG/M2 | TEMPERATURE: 98.2 F | OXYGEN SATURATION: 98 % | HEART RATE: 104 BPM | HEIGHT: 71 IN | WEIGHT: 110 LBS | SYSTOLIC BLOOD PRESSURE: 110 MMHG

## 2025-01-02 VITALS
SYSTOLIC BLOOD PRESSURE: 101 MMHG | OXYGEN SATURATION: 97 % | TEMPERATURE: 98.2 F | RESPIRATION RATE: 18 BRPM | DIASTOLIC BLOOD PRESSURE: 72 MMHG | WEIGHT: 110 LBS | BODY MASS INDEX: 15.4 KG/M2 | HEIGHT: 71 IN | HEART RATE: 122 BPM

## 2025-01-02 LAB
AMPHET UR QL SCN: NEGATIVE
ANION GAP SERPL CALCULATED.3IONS-SCNC: 17 MMOL/L (ref 9–16)
BACTERIA URNS QL MICRO: ABNORMAL
BARBITURATES UR QL SCN: NEGATIVE
BASOPHILS # BLD: <0.03 K/UL (ref 0–0.2)
BASOPHILS NFR BLD: 0 % (ref 0–2)
BENZODIAZ UR QL: POSITIVE
BILIRUB UR QL STRIP: NEGATIVE
BUN SERPL-MCNC: 11 MG/DL (ref 6–20)
CALCIUM SERPL-MCNC: 8.7 MG/DL (ref 8.6–10.4)
CANNABINOIDS UR QL SCN: POSITIVE
CHLORIDE SERPL-SCNC: 100 MMOL/L (ref 98–107)
CLARITY UR: ABNORMAL
CO2 SERPL-SCNC: 26 MMOL/L (ref 20–31)
COCAINE UR QL SCN: NEGATIVE
COLOR UR: YELLOW
CREAT SERPL-MCNC: 0.6 MG/DL (ref 0.5–0.9)
EKG ATRIAL RATE: 103 BPM
EKG P AXIS: 75 DEGREES
EKG P-R INTERVAL: 134 MS
EKG Q-T INTERVAL: 350 MS
EKG QRS DURATION: 88 MS
EKG QTC CALCULATION (BAZETT): 458 MS
EKG R AXIS: 73 DEGREES
EKG VENTRICULAR RATE: 103 BPM
EOSINOPHIL # BLD: 0.04 K/UL (ref 0–0.44)
EOSINOPHILS RELATIVE PERCENT: 1 % (ref 1–4)
EPI CELLS #/AREA URNS HPF: ABNORMAL /HPF (ref 0–5)
ERYTHROCYTE [DISTWIDTH] IN BLOOD BY AUTOMATED COUNT: 14.9 % (ref 11.8–14.4)
ETHANOL PERCENT: 0.26 %
ETHANOLAMINE SERPL-MCNC: 261 MG/DL (ref 0–0.08)
FENTANYL UR QL: NEGATIVE
GFR, ESTIMATED: >90 ML/MIN/1.73M2
GLUCOSE SERPL-MCNC: 110 MG/DL (ref 74–99)
GLUCOSE UR STRIP-MCNC: NEGATIVE MG/DL
HCG UR QL: NEGATIVE
HCT VFR BLD AUTO: 36.4 % (ref 36.3–47.1)
HGB BLD-MCNC: 12.9 G/DL (ref 11.9–15.1)
HGB UR QL STRIP.AUTO: ABNORMAL
IMM GRANULOCYTES # BLD AUTO: 0.03 K/UL (ref 0–0.3)
IMM GRANULOCYTES NFR BLD: 0 %
KETONES UR STRIP-MCNC: NEGATIVE MG/DL
LEUKOCYTE ESTERASE UR QL STRIP: ABNORMAL
LYMPHOCYTES NFR BLD: 2.54 K/UL (ref 1.1–3.7)
LYMPHOCYTES RELATIVE PERCENT: 29 % (ref 24–43)
MCH RBC QN AUTO: 31.4 PG (ref 25.2–33.5)
MCHC RBC AUTO-ENTMCNC: 35.4 G/DL (ref 28.4–34.8)
MCV RBC AUTO: 88.6 FL (ref 82.6–102.9)
METHADONE UR QL: NEGATIVE
MONOCYTES NFR BLD: 0.71 K/UL (ref 0.1–1.2)
MONOCYTES NFR BLD: 8 % (ref 3–12)
NEUTROPHILS NFR BLD: 62 % (ref 36–65)
NEUTS SEG NFR BLD: 5.45 K/UL (ref 1.5–8.1)
NITRITE UR QL STRIP: NEGATIVE
NRBC BLD-RTO: 0 PER 100 WBC
OPIATES UR QL SCN: NEGATIVE
OXYCODONE UR QL SCN: NEGATIVE
PCP UR QL SCN: NEGATIVE
PH UR STRIP: 8 [PH] (ref 5–8)
PLATELET # BLD AUTO: 206 K/UL (ref 138–453)
PMV BLD AUTO: 8.9 FL (ref 8.1–13.5)
POTASSIUM SERPL-SCNC: 3.4 MMOL/L (ref 3.7–5.3)
PROT UR STRIP-MCNC: ABNORMAL MG/DL
RBC # BLD AUTO: 4.11 M/UL (ref 3.95–5.11)
RBC # BLD: ABNORMAL 10*6/UL
RBC #/AREA URNS HPF: ABNORMAL /HPF (ref 0–2)
SODIUM SERPL-SCNC: 143 MMOL/L (ref 136–145)
SP GR UR STRIP: 1.02 (ref 1–1.03)
TROPONIN I SERPL HS-MCNC: <6 NG/L (ref 0–14)
UROBILINOGEN UR STRIP-ACNC: NORMAL EU/DL (ref 0–1)
WBC #/AREA URNS HPF: ABNORMAL /HPF (ref 0–5)
WBC OTHER # BLD: 8.8 K/UL (ref 3.5–11.3)

## 2025-01-02 PROCEDURE — 81001 URINALYSIS AUTO W/SCOPE: CPT

## 2025-01-02 PROCEDURE — 87186 SC STD MICRODIL/AGAR DIL: CPT

## 2025-01-02 PROCEDURE — 87086 URINE CULTURE/COLONY COUNT: CPT

## 2025-01-02 PROCEDURE — 80307 DRUG TEST PRSMV CHEM ANLYZR: CPT

## 2025-01-02 PROCEDURE — 6370000000 HC RX 637 (ALT 250 FOR IP): Performed by: STUDENT IN AN ORGANIZED HEALTH CARE EDUCATION/TRAINING PROGRAM

## 2025-01-02 PROCEDURE — 81025 URINE PREGNANCY TEST: CPT

## 2025-01-02 PROCEDURE — 87088 URINE BACTERIA CULTURE: CPT

## 2025-01-02 RX ORDER — CEPHALEXIN 500 MG/1
500 CAPSULE ORAL ONCE
Status: COMPLETED | OUTPATIENT
Start: 2025-01-02 | End: 2025-01-02

## 2025-01-02 RX ORDER — PHENAZOPYRIDINE HYDROCHLORIDE 200 MG/1
200 TABLET, FILM COATED ORAL ONCE
Status: COMPLETED | OUTPATIENT
Start: 2025-01-02 | End: 2025-01-02

## 2025-01-02 RX ORDER — CEPHALEXIN 500 MG/1
500 CAPSULE ORAL 2 TIMES DAILY
Qty: 14 CAPSULE | Refills: 0 | Status: SHIPPED | OUTPATIENT
Start: 2025-01-02 | End: 2025-01-09

## 2025-01-02 RX ADMIN — PHENAZOPYRIDINE 200 MG: 200 TABLET ORAL at 02:30

## 2025-01-02 RX ADMIN — CEPHALEXIN 500 MG: 500 CAPSULE ORAL at 02:31

## 2025-01-02 NOTE — ED NOTES
Pt presents to ED via private auto with c/o feeling \"off\". Pt states she was drinking alcohol today. Pt denies pain. Pt afebrile, vitals stable. Pt answering questions appropriately. Even, non-labored breathing.

## 2025-01-03 LAB
MICROORGANISM SPEC CULT: ABNORMAL
SPECIMEN DESCRIPTION: ABNORMAL

## 2025-01-03 NOTE — ED PROVIDER NOTES
98.2 °F (36.8 °C) (Oral)   Resp 29   Ht 1.803 m (5' 11\")   Wt 49.9 kg (110 lb)   SpO2 98%   BMI 15.34 kg/m²    Physical Exam  Vitals and nursing note reviewed.   Constitutional:       General: She is not in acute distress.     Appearance: She is well-developed. She is not ill-appearing or toxic-appearing.   HENT:      Head: Normocephalic and atraumatic.   Eyes:      General: No scleral icterus.     Conjunctiva/sclera: Conjunctivae normal.      Pupils: Pupils are equal, round, and reactive to light.   Cardiovascular:      Rate and Rhythm: Normal rate.   Pulmonary:      Effort: Pulmonary effort is normal. No respiratory distress.   Abdominal:      Comments: Mildly tender lower abdominal palpation, no guarding no masses   Musculoskeletal:         General: Normal range of motion.   Skin:     General: Skin is warm and dry.      Findings: No erythema or rash.   Neurological:      Mental Status: She is alert and oriented to person, place, and time.      Comments: Slurring words, linear thought process   Psychiatric:         Behavior: Behavior normal.         MEDICAL DECISION MAKING / ED COURSE:   Summary of Patient Presentation:      1)  Number and Complexity of Problems  Problem List This Visit:    1. Acute cystitis without hematuria    2. Acute alcoholic intoxication without complication (HCC)        Differential Diagnosis: Alcohol intoxication versus UTI versus sepsis    Diagnoses Considered but Do Not Suspect: Sepsis    Pertinent Comorbid Conditions:    Past Medical History:   Diagnosis Date    Hypokalemia     IBS (irritable bowel syndrome)        2)  Data Reviewed    External Documents Reviewed: Previous ER visits reviewed by myself      3)  Treatment and Disposition  [Patient repeat assessment, Disposition discussion with patient/family, Case discussed with consulting clinician, MIPS, Social determinants of health impacting treatment or disposition, Shared Decision Making, Code Status Discussion:]    Patient  POSITIVE [MS]   0037 Cannabinoid Scrn, Ur(!): POSITIVE [MS]   0100 Ethanol Lvl(!): 261 [MS]   0113 Benzodiazepine Screen, Urine(!): POSITIVE [MS]      ED Course User Index  [MS] Gokul Salomon DO         CRITICAL CARE:   0    PROCEDURES:  none    FINAL IMPRESSION      1. Acute cystitis without hematuria    2. Acute alcoholic intoxication without complication (HCC)          DISPOSITION/PLAN   DISPOSITION Decision To Discharge 01/02/2025 02:52:29 AM   DISPOSITION CONDITION Stable           OUTPATIENT FOLLOW UP THE PATIENT:  Wale Zhang MD  [address]  [facility] [address]  [phone]    Schedule an appointment as soon as possible for a visit in 1 week  As needed      DISCHARGE MEDICATIONS:  Discharge Medication List as of 1/2/2025  3:06 AM        START taking these medications    Details   cephALEXin (KEFLEX) 500 MG capsule Take 1 capsule by mouth 2 times daily for 7 days, Disp-14 capsule, R-0Normal             Gokul Salomon DO  EmergencyMedicine Attending    (Please note that portions of this note were completed with a voice recognition program.  Efforts were made to edit the dictations but occasionally words are mis-transcribed.)     Gokul Salomon DO  01/02/25 1926

## 2025-01-10 ENCOUNTER — HOSPITAL ENCOUNTER (INPATIENT)
Age: 30
LOS: 1 days | Discharge: LEFT BEFORE DISCHARGE | DRG: 770 | End: 2025-01-11
Attending: EMERGENCY MEDICINE | Admitting: FAMILY MEDICINE
Payer: MEDICAID

## 2025-01-10 DIAGNOSIS — F10.930 ALCOHOL WITHDRAWAL SYNDROME WITHOUT COMPLICATION (HCC): Primary | ICD-10-CM

## 2025-01-10 PROBLEM — F10.139 ALCOHOL ABUSE WITH WITHDRAWAL, UNSPECIFIED (HCC): Status: ACTIVE | Noted: 2025-01-10

## 2025-01-10 PROBLEM — N39.0 UTI (URINARY TRACT INFECTION): Status: ACTIVE | Noted: 2025-01-10

## 2025-01-10 LAB
ALBUMIN SERPL-MCNC: 3.9 G/DL (ref 3.5–5.2)
ALBUMIN/GLOB SERPL: 1.1 {RATIO} (ref 1–2.5)
ALP SERPL-CCNC: 98 U/L (ref 35–104)
ALT SERPL-CCNC: 12 U/L (ref 10–35)
AMPHET UR QL SCN: NEGATIVE
ANION GAP SERPL CALCULATED.3IONS-SCNC: 15 MMOL/L (ref 9–16)
AST SERPL-CCNC: 30 U/L (ref 10–35)
BACTERIA URNS QL MICRO: ABNORMAL
BARBITURATES UR QL SCN: NEGATIVE
BASOPHILS # BLD: 0 K/UL (ref 0–0.2)
BASOPHILS NFR BLD: 0 %
BENZODIAZ UR QL: POSITIVE
BILIRUB SERPL-MCNC: 0.3 MG/DL (ref 0–1.2)
BILIRUB UR QL STRIP: NEGATIVE
BUN SERPL-MCNC: 15 MG/DL (ref 6–20)
CALCIUM SERPL-MCNC: 8.9 MG/DL (ref 8.6–10.4)
CANNABINOIDS UR QL SCN: POSITIVE
CHLORIDE SERPL-SCNC: 96 MMOL/L (ref 98–107)
CLARITY UR: ABNORMAL
CO2 SERPL-SCNC: 31 MMOL/L (ref 20–31)
COCAINE UR QL SCN: NEGATIVE
COLOR UR: YELLOW
CREAT SERPL-MCNC: 0.8 MG/DL (ref 0.5–0.9)
EKG ATRIAL RATE: 130 BPM
EKG P-R INTERVAL: 72 MS
EKG Q-T INTERVAL: 396 MS
EKG QRS DURATION: 78 MS
EKG QTC CALCULATION (BAZETT): 582 MS
EKG R AXIS: 71 DEGREES
EKG T AXIS: 80 DEGREES
EKG VENTRICULAR RATE: 130 BPM
EOSINOPHIL # BLD: 0.05 K/UL (ref 0–0.4)
EOSINOPHILS RELATIVE PERCENT: 1 % (ref 1–4)
EPI CELLS #/AREA URNS HPF: ABNORMAL /HPF (ref 0–5)
ERYTHROCYTE [DISTWIDTH] IN BLOOD BY AUTOMATED COUNT: 15.9 % (ref 11.8–14.4)
ETHANOL PERCENT: 0.09 %
ETHANOLAMINE SERPL-MCNC: 93 MG/DL (ref 0–0.08)
FENTANYL UR QL: NEGATIVE
FOLATE SERPL-MCNC: 6.6 NG/ML (ref 4.8–24.2)
GFR, ESTIMATED: >90 ML/MIN/1.73M2
GLUCOSE SERPL-MCNC: 107 MG/DL (ref 74–99)
GLUCOSE UR STRIP-MCNC: NEGATIVE MG/DL
HCG UR QL: NEGATIVE
HCT VFR BLD AUTO: 39.2 % (ref 36.3–47.1)
HGB BLD-MCNC: 13 G/DL (ref 11.9–15.1)
HGB UR QL STRIP.AUTO: NEGATIVE
IMM GRANULOCYTES # BLD AUTO: 0 K/UL (ref 0–0.3)
IMM GRANULOCYTES NFR BLD: 0 %
KETONES UR STRIP-MCNC: NEGATIVE MG/DL
LEUKOCYTE ESTERASE UR QL STRIP: ABNORMAL
LYMPHOCYTES NFR BLD: 0.53 K/UL (ref 1–4.8)
LYMPHOCYTES RELATIVE PERCENT: 10 % (ref 24–44)
MAGNESIUM SERPL-MCNC: 1.8 MG/DL (ref 1.6–2.6)
MCH RBC QN AUTO: 30.9 PG (ref 25.2–33.5)
MCHC RBC AUTO-ENTMCNC: 33.2 G/DL (ref 28.4–34.8)
MCV RBC AUTO: 93.1 FL (ref 82.6–102.9)
METHADONE UR QL: NEGATIVE
MONOCYTES NFR BLD: 0.32 K/UL (ref 0.2–0.8)
MONOCYTES NFR BLD: 6 % (ref 1–7)
NEUTROPHILS NFR BLD: 83 % (ref 36–66)
NEUTS SEG NFR BLD: 4.4 K/UL (ref 1.8–7.7)
NITRITE UR QL STRIP: NEGATIVE
NRBC BLD-RTO: 0 PER 100 WBC
OPIATES UR QL SCN: NEGATIVE
OXYCODONE UR QL SCN: NEGATIVE
PCP UR QL SCN: NEGATIVE
PH UR STRIP: 8.5 [PH] (ref 5–8)
PLATELET # BLD AUTO: 227 K/UL (ref 138–453)
PMV BLD AUTO: 8.7 FL (ref 8.1–13.5)
POTASSIUM SERPL-SCNC: 2.9 MMOL/L (ref 3.7–5.3)
POTASSIUM SERPL-SCNC: 3.2 MMOL/L (ref 3.7–5.3)
PROT SERPL-MCNC: 7.4 G/DL (ref 6.6–8.7)
PROT UR STRIP-MCNC: ABNORMAL MG/DL
RBC # BLD AUTO: 4.21 M/UL (ref 3.95–5.11)
RBC #/AREA URNS HPF: ABNORMAL /HPF (ref 0–2)
SODIUM SERPL-SCNC: 143 MMOL/L (ref 136–145)
SP GR UR STRIP: 1.01 (ref 1–1.03)
TEST INFORMATION: ABNORMAL
UROBILINOGEN UR STRIP-ACNC: NORMAL EU/DL (ref 0–1)
VIT B12 SERPL-MCNC: 464 PG/ML (ref 232–1245)
WBC #/AREA URNS HPF: ABNORMAL /HPF (ref 0–5)
WBC OTHER # BLD: 5.3 K/UL (ref 3.5–11.3)

## 2025-01-10 PROCEDURE — 36415 COLL VENOUS BLD VENIPUNCTURE: CPT

## 2025-01-10 PROCEDURE — G0480 DRUG TEST DEF 1-7 CLASSES: HCPCS

## 2025-01-10 PROCEDURE — 2500000003 HC RX 250 WO HCPCS: Performed by: EMERGENCY MEDICINE

## 2025-01-10 PROCEDURE — 2500000003 HC RX 250 WO HCPCS: Performed by: NURSE PRACTITIONER

## 2025-01-10 PROCEDURE — 6370000000 HC RX 637 (ALT 250 FOR IP)

## 2025-01-10 PROCEDURE — 96375 TX/PRO/DX INJ NEW DRUG ADDON: CPT

## 2025-01-10 PROCEDURE — 82607 VITAMIN B-12: CPT

## 2025-01-10 PROCEDURE — 6360000002 HC RX W HCPCS: Performed by: EMERGENCY MEDICINE

## 2025-01-10 PROCEDURE — 82746 ASSAY OF FOLIC ACID SERUM: CPT

## 2025-01-10 PROCEDURE — 96361 HYDRATE IV INFUSION ADD-ON: CPT

## 2025-01-10 PROCEDURE — 2580000003 HC RX 258: Performed by: EMERGENCY MEDICINE

## 2025-01-10 PROCEDURE — 85025 COMPLETE CBC W/AUTO DIFF WBC: CPT

## 2025-01-10 PROCEDURE — 99285 EMERGENCY DEPT VISIT HI MDM: CPT

## 2025-01-10 PROCEDURE — 83735 ASSAY OF MAGNESIUM: CPT

## 2025-01-10 PROCEDURE — 2580000003 HC RX 258: Performed by: NURSE PRACTITIONER

## 2025-01-10 PROCEDURE — 6360000002 HC RX W HCPCS: Performed by: NURSE PRACTITIONER

## 2025-01-10 PROCEDURE — 96374 THER/PROPH/DIAG INJ IV PUSH: CPT

## 2025-01-10 PROCEDURE — 2580000003 HC RX 258

## 2025-01-10 PROCEDURE — 80053 COMPREHEN METABOLIC PANEL: CPT

## 2025-01-10 PROCEDURE — 87086 URINE CULTURE/COLONY COUNT: CPT

## 2025-01-10 PROCEDURE — 84132 ASSAY OF SERUM POTASSIUM: CPT

## 2025-01-10 PROCEDURE — 81001 URINALYSIS AUTO W/SCOPE: CPT

## 2025-01-10 PROCEDURE — 87186 SC STD MICRODIL/AGAR DIL: CPT

## 2025-01-10 PROCEDURE — 87088 URINE BACTERIA CULTURE: CPT

## 2025-01-10 PROCEDURE — 81025 URINE PREGNANCY TEST: CPT

## 2025-01-10 PROCEDURE — 93005 ELECTROCARDIOGRAM TRACING: CPT | Performed by: EMERGENCY MEDICINE

## 2025-01-10 PROCEDURE — 80307 DRUG TEST PRSMV CHEM ANLYZR: CPT

## 2025-01-10 PROCEDURE — 6370000000 HC RX 637 (ALT 250 FOR IP): Performed by: NURSE PRACTITIONER

## 2025-01-10 PROCEDURE — 6360000002 HC RX W HCPCS

## 2025-01-10 PROCEDURE — 2060000000 HC ICU INTERMEDIATE R&B

## 2025-01-10 RX ORDER — ONDANSETRON 4 MG/1
4 TABLET, ORALLY DISINTEGRATING ORAL EVERY 8 HOURS PRN
Status: CANCELLED | OUTPATIENT
Start: 2025-01-10

## 2025-01-10 RX ORDER — LORAZEPAM 2 MG/ML
1 INJECTION INTRAMUSCULAR
Status: CANCELLED | OUTPATIENT
Start: 2025-01-10

## 2025-01-10 RX ORDER — LORAZEPAM 1 MG/1
2 TABLET ORAL
Status: CANCELLED | OUTPATIENT
Start: 2025-01-10

## 2025-01-10 RX ORDER — PROMETHAZINE HYDROCHLORIDE 12.5 MG/1
12.5 TABLET ORAL EVERY 4 HOURS PRN
Status: DISCONTINUED | OUTPATIENT
Start: 2025-01-10 | End: 2025-01-11 | Stop reason: HOSPADM

## 2025-01-10 RX ORDER — FOLIC ACID 1 MG/1
1 TABLET ORAL DAILY
Status: CANCELLED | OUTPATIENT
Start: 2025-01-10

## 2025-01-10 RX ORDER — LORAZEPAM 2 MG/ML
2 INJECTION INTRAMUSCULAR
Status: CANCELLED | OUTPATIENT
Start: 2025-01-10

## 2025-01-10 RX ORDER — POTASSIUM CHLORIDE 7.45 MG/ML
10 INJECTION INTRAVENOUS PRN
Status: DISCONTINUED | OUTPATIENT
Start: 2025-01-10 | End: 2025-01-11 | Stop reason: HOSPADM

## 2025-01-10 RX ORDER — LORAZEPAM 1 MG/1
1 TABLET ORAL EVERY 4 HOURS PRN
Status: DISCONTINUED | OUTPATIENT
Start: 2025-01-10 | End: 2025-01-11 | Stop reason: HOSPADM

## 2025-01-10 RX ORDER — DROPERIDOL 2.5 MG/ML
0.62 INJECTION, SOLUTION INTRAMUSCULAR; INTRAVENOUS ONCE
Status: COMPLETED | OUTPATIENT
Start: 2025-01-10 | End: 2025-01-10

## 2025-01-10 RX ORDER — POTASSIUM CHLORIDE 7.45 MG/ML
10 INJECTION INTRAVENOUS ONCE
Status: COMPLETED | OUTPATIENT
Start: 2025-01-10 | End: 2025-01-10

## 2025-01-10 RX ORDER — SODIUM CHLORIDE 0.9 % (FLUSH) 0.9 %
5-40 SYRINGE (ML) INJECTION PRN
Status: DISCONTINUED | OUTPATIENT
Start: 2025-01-10 | End: 2025-01-11 | Stop reason: HOSPADM

## 2025-01-10 RX ORDER — PANTOPRAZOLE SODIUM 40 MG/1
40 TABLET, DELAYED RELEASE ORAL
Status: CANCELLED | OUTPATIENT
Start: 2025-01-11

## 2025-01-10 RX ORDER — LORAZEPAM 2 MG/ML
1 INJECTION INTRAMUSCULAR ONCE
Status: COMPLETED | OUTPATIENT
Start: 2025-01-10 | End: 2025-01-10

## 2025-01-10 RX ORDER — LORAZEPAM 2 MG/ML
1 INJECTION INTRAMUSCULAR EVERY 6 HOURS PRN
Status: DISCONTINUED | OUTPATIENT
Start: 2025-01-10 | End: 2025-01-11 | Stop reason: HOSPADM

## 2025-01-10 RX ORDER — ONDANSETRON 2 MG/ML
4 INJECTION INTRAMUSCULAR; INTRAVENOUS EVERY 6 HOURS PRN
Status: CANCELLED | OUTPATIENT
Start: 2025-01-10

## 2025-01-10 RX ORDER — TRETINOIN 0.5 MG/G
CREAM TOPICAL
COMMUNITY

## 2025-01-10 RX ORDER — ONDANSETRON 2 MG/ML
4 INJECTION INTRAMUSCULAR; INTRAVENOUS ONCE
Status: COMPLETED | OUTPATIENT
Start: 2025-01-10 | End: 2025-01-10

## 2025-01-10 RX ORDER — 0.9 % SODIUM CHLORIDE 0.9 %
1000 INTRAVENOUS SOLUTION INTRAVENOUS ONCE
Status: COMPLETED | OUTPATIENT
Start: 2025-01-10 | End: 2025-01-10

## 2025-01-10 RX ORDER — PRAZOSIN HYDROCHLORIDE 1 MG/1
1 CAPSULE ORAL NIGHTLY
COMMUNITY

## 2025-01-10 RX ORDER — MAGNESIUM SULFATE IN WATER 40 MG/ML
2000 INJECTION, SOLUTION INTRAVENOUS PRN
Status: DISCONTINUED | OUTPATIENT
Start: 2025-01-10 | End: 2025-01-11 | Stop reason: HOSPADM

## 2025-01-10 RX ORDER — SODIUM CHLORIDE 9 MG/ML
INJECTION, SOLUTION INTRAVENOUS CONTINUOUS
Status: DISCONTINUED | OUTPATIENT
Start: 2025-01-10 | End: 2025-01-11 | Stop reason: HOSPADM

## 2025-01-10 RX ORDER — ONDANSETRON 2 MG/ML
4 INJECTION INTRAMUSCULAR; INTRAVENOUS ONCE
Status: DISCONTINUED | OUTPATIENT
Start: 2025-01-10 | End: 2025-01-10

## 2025-01-10 RX ORDER — POTASSIUM CHLORIDE 1500 MG/1
40 TABLET, EXTENDED RELEASE ORAL PRN
Status: DISCONTINUED | OUTPATIENT
Start: 2025-01-10 | End: 2025-01-11 | Stop reason: HOSPADM

## 2025-01-10 RX ORDER — FAMOTIDINE 20 MG/1
20 TABLET, FILM COATED ORAL 2 TIMES DAILY
COMMUNITY
End: 2025-01-10

## 2025-01-10 RX ORDER — ACETAMINOPHEN 325 MG/1
650 TABLET ORAL EVERY 6 HOURS PRN
Status: DISCONTINUED | OUTPATIENT
Start: 2025-01-10 | End: 2025-01-11 | Stop reason: HOSPADM

## 2025-01-10 RX ORDER — SODIUM CHLORIDE 0.9 % (FLUSH) 0.9 %
5-40 SYRINGE (ML) INJECTION EVERY 12 HOURS SCHEDULED
Status: DISCONTINUED | OUTPATIENT
Start: 2025-01-10 | End: 2025-01-11 | Stop reason: HOSPADM

## 2025-01-10 RX ORDER — BUPROPION HYDROCHLORIDE 150 MG/1
450 TABLET ORAL EVERY MORNING
COMMUNITY

## 2025-01-10 RX ORDER — CEPHALEXIN 500 MG/1
500 CAPSULE ORAL 2 TIMES DAILY
COMMUNITY

## 2025-01-10 RX ORDER — POLYETHYLENE GLYCOL 3350 17 G/17G
17 POWDER, FOR SOLUTION ORAL DAILY PRN
Status: DISCONTINUED | OUTPATIENT
Start: 2025-01-10 | End: 2025-01-11 | Stop reason: HOSPADM

## 2025-01-10 RX ORDER — LORAZEPAM 1 MG/1
1 TABLET ORAL
Status: CANCELLED | OUTPATIENT
Start: 2025-01-10

## 2025-01-10 RX ORDER — SODIUM CHLORIDE 9 MG/ML
INJECTION, SOLUTION INTRAVENOUS PRN
Status: DISCONTINUED | OUTPATIENT
Start: 2025-01-10 | End: 2025-01-11 | Stop reason: HOSPADM

## 2025-01-10 RX ORDER — ACETAMINOPHEN 650 MG/1
650 SUPPOSITORY RECTAL EVERY 6 HOURS PRN
Status: DISCONTINUED | OUTPATIENT
Start: 2025-01-10 | End: 2025-01-11 | Stop reason: HOSPADM

## 2025-01-10 RX ORDER — ENOXAPARIN SODIUM 100 MG/ML
30 INJECTION SUBCUTANEOUS DAILY
Status: DISCONTINUED | OUTPATIENT
Start: 2025-01-10 | End: 2025-01-11 | Stop reason: HOSPADM

## 2025-01-10 RX ORDER — OLANZAPINE 5 MG/1
5 TABLET ORAL NIGHTLY
Status: CANCELLED | OUTPATIENT
Start: 2025-01-10

## 2025-01-10 RX ORDER — CHLORDIAZEPOXIDE HYDROCHLORIDE 10 MG/1
10 CAPSULE, GELATIN COATED ORAL 3 TIMES DAILY
Status: DISCONTINUED | OUTPATIENT
Start: 2025-01-10 | End: 2025-01-11 | Stop reason: HOSPADM

## 2025-01-10 RX ORDER — GAUZE BANDAGE 2" X 2"
100 BANDAGE TOPICAL DAILY
Status: CANCELLED | OUTPATIENT
Start: 2025-01-10

## 2025-01-10 RX ADMIN — POTASSIUM CHLORIDE 40 MEQ: 1500 TABLET, EXTENDED RELEASE ORAL at 18:03

## 2025-01-10 RX ADMIN — CHLORDIAZEPOXIDE HYDROCHLORIDE 10 MG: 10 CAPSULE ORAL at 19:47

## 2025-01-10 RX ADMIN — CHLORDIAZEPOXIDE HYDROCHLORIDE 10 MG: 10 CAPSULE ORAL at 13:22

## 2025-01-10 RX ADMIN — POTASSIUM CHLORIDE 10 MEQ: 7.46 INJECTION, SOLUTION INTRAVENOUS at 09:54

## 2025-01-10 RX ADMIN — POTASSIUM CHLORIDE 10 MEQ: 7.46 INJECTION, SOLUTION INTRAVENOUS at 08:47

## 2025-01-10 RX ADMIN — ONDANSETRON 4 MG: 2 INJECTION, SOLUTION INTRAMUSCULAR; INTRAVENOUS at 07:15

## 2025-01-10 RX ADMIN — THIAMINE HYDROCHLORIDE: 100 INJECTION, SOLUTION INTRAMUSCULAR; INTRAVENOUS at 13:10

## 2025-01-10 RX ADMIN — DROPERIDOL 0.62 MG: 2.5 INJECTION, SOLUTION INTRAMUSCULAR; INTRAVENOUS at 08:45

## 2025-01-10 RX ADMIN — SODIUM CHLORIDE 1000 ML: 9 INJECTION, SOLUTION INTRAVENOUS at 11:16

## 2025-01-10 RX ADMIN — LORAZEPAM 1 MG: 2 INJECTION INTRAMUSCULAR; INTRAVENOUS at 10:47

## 2025-01-10 RX ADMIN — LORAZEPAM 1 MG: 1 TABLET ORAL at 22:20

## 2025-01-10 RX ADMIN — FAMOTIDINE 20 MG: 10 INJECTION INTRAVENOUS at 07:15

## 2025-01-10 RX ADMIN — SODIUM CHLORIDE 1000 ML: 9 INJECTION, SOLUTION INTRAVENOUS at 14:14

## 2025-01-10 RX ADMIN — LORAZEPAM 1 MG: 2 INJECTION INTRAMUSCULAR; INTRAVENOUS at 08:45

## 2025-01-10 RX ADMIN — ACETAMINOPHEN 650 MG: 325 TABLET ORAL at 11:59

## 2025-01-10 RX ADMIN — LORAZEPAM 1 MG: 2 INJECTION INTRAMUSCULAR; INTRAVENOUS at 18:06

## 2025-01-10 RX ADMIN — FAMOTIDINE 20 MG: 10 INJECTION, SOLUTION INTRAVENOUS at 13:21

## 2025-01-10 RX ADMIN — SODIUM CHLORIDE: 9 INJECTION, SOLUTION INTRAVENOUS at 08:54

## 2025-01-10 RX ADMIN — SODIUM CHLORIDE: 9 INJECTION, SOLUTION INTRAVENOUS at 19:46

## 2025-01-10 RX ADMIN — WATER 1000 MG: 1 INJECTION INTRAMUSCULAR; INTRAVENOUS; SUBCUTANEOUS at 11:59

## 2025-01-10 RX ADMIN — FAMOTIDINE 20 MG: 10 INJECTION, SOLUTION INTRAVENOUS at 19:47

## 2025-01-10 ASSESSMENT — ENCOUNTER SYMPTOMS
EYE DISCHARGE: 0
ABDOMINAL PAIN: 0
NAUSEA: 1
EYES NEGATIVE: 1
COUGH: 0
ALLERGIC/IMMUNOLOGIC NEGATIVE: 1
ABDOMINAL PAIN: 1
VOMITING: 1
BACK PAIN: 0
SHORTNESS OF BREATH: 0
EYE PAIN: 0
FACIAL SWELLING: 0
CHEST TIGHTNESS: 0
ABDOMINAL DISTENTION: 0
DIARRHEA: 0

## 2025-01-10 ASSESSMENT — PAIN - FUNCTIONAL ASSESSMENT: PAIN_FUNCTIONAL_ASSESSMENT: 0-10

## 2025-01-10 ASSESSMENT — PAIN SCALES - GENERAL
PAINLEVEL_OUTOF10: 7
PAINLEVEL_OUTOF10: 8

## 2025-01-10 NOTE — PROGRESS NOTES
Social work: good conversation with pt. Open to talking about her drinking. Some drug use although not specific on what drugs. Pt states she did not drink today but drinks everyday. Just moved from [place] and [place] but does know she will need to change [place] med[place] as [place] is now an [place] resident recently [address] is new address [place] [address]. Lives with boyfriend who also drinks both are not employed at this time. She worked at [employer] but \"lost\" that job. Pt's longest sobriety has been 10 months in treatment and sober living she did ok until out of treatment facility.    Provided pt a full view of all [place] treatment for alcohol and drugs in our area the [place] [facility] Guide provided. Offered to provide [place] services also while she [place] medicaid. Pt refused all options for treatment and detox at this time. When asked if her boyfriend would be supportive if she wanted to stop drinking she shook her head yes.  So discussed what she would want if she could maintain her sobriety for a career. 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 Went over options for short term training and those opportunities and Nursing careers as well. Attempted to get pt into a life review moment to see if her sobriety could be encouraged. She did accept the hand out provided. When probed stated she was probably going home to drink today as she \"doesn't feel good without drinking\". Discussed long range drinking and health overall. Pt lost the number for her sponsor from [employer]. Did not consider going to a meeting. Not driving now nor is boyfriend. Asked if that was due to drinking she stated, \"yes\". Pt requested more to drink and a warm blanket.     Betty velázquez w

## 2025-01-10 NOTE — ED NOTES
Pt ambulated to bathroom with steady gait. Pt screaming in room, \"I need medicine!\" Pt educated on MD putting in orders. 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 Seizure precautions implemented

## 2025-01-10 NOTE — ED NOTES
ED TO INPATIENT SBAR HANDOFF    Patient Name: Ese Harding   :  1995  29 y.o.   MRN:  [id number]  Preferred Name    ED Room #:  [facility]27/27  Family/Caregiver Present no   Restraints no   Sitter no   Sepsis Risk Score      Situation  Code Status: Prior No additional code details.    Allergies: Ciprofloxacin  Weight: Patient Vitals for the past 96 hrs (Last 3 readings):   Weight   01/10/25 0641 49.9 kg (110 lb)     Arrived from: home  Chief Complaint:   Chief Complaint   Patient presents with    Alcohol Intoxication     Pt states she has been drinking for days and wants to go to detox     Hospital Problem/Diagnosis:  Principal Problem:    Alcohol abuse with withdrawal, unspecified (HCC)  Resolved Problems:    * No resolved hospital problems. *    Imaging:   No orders to display     Abnormal labs:   Abnormal Labs Reviewed   CBC WITH AUTO DIFFERENTIAL - Abnormal; Notable for the following components:       Result Value    RDW 15.9 (*)     Neutrophils % 83 (*)     Lymphocytes % 10 (*)     Lymphocytes Absolute 0.53 (*)     All other components within normal limits   COMPREHENSIVE METABOLIC PANEL - Abnormal; Notable for the following components:    Potassium 2.9 (*)     Chloride 96 (*)     Glucose 107 (*)     All other components within normal limits   ETHANOL - Abnormal; Notable for the following components:    Ethanol Lvl 93 (*)     All other components within normal limits     Critical values: yes     Abnormal Assessment Findings: hypokalemia    Background  History:   Past Medical History:   Diagnosis Date    Hypokalemia     IBS (irritable bowel syndrome)        Medication Review:  [] Via patient  [] From medication list  [] Pharmacy Med Rec  [] Unable to assess based on patient condition  [] Rn not able to complete      Assessment    Vitals/MEWS: MEWS Score: 3  Level of Consciousness: Alert (0)   Vitals:    01/10/25 0919 01/10/25 0928 01/10/25 0948 01/10/25 1015   BP: 102/68 106/68 106/81 104/66   Pulse: (!) 139  over  [] Unable to complete due to patient condition  [] Unable to validate home medications      [] There are one or more home medications that need clarification before Medication Reconciliation can be completed. The Med List Status has been marked as In Progress.     To assist with Home Medication Reconciliation the following actions have been taken:    [] Family requested to bring medications into the hospital  [] Family requested to call hospital with medication list  [] Message left with physician office  [] Request for medical records made to   [] Other     Electronically signed by: Electronically signed by Ruby Madison RN on 1/10/2025 at 10:19 AM

## 2025-01-10 NOTE — ED NOTES
Pt arrived to the ED with c/o drinking alcohol and wanting to go to a detox center. Pt states she has been an alcoholic for 13 years. Pt is A&O x4, vitals are stable other than tachycardia and breathing is even and non-labored. Pt c/o abd pain from dry heaving and rates it 8/10. Pt states she doesn't have a drinking preference and drinks whatever she can get her hands on. Pt states she drank 5 beets in the last 2hrs. Pt denies needs at this time and call light within reach.

## 2025-01-10 NOTE — H&P
emergency room for alcohol intoxication.  While in the emergency department labs were drawn and her potassium was 2.9 and is being replaced.  Ethanol levels were 93.  Patient was also positive for benzodiazepines and cannabinoids. Patient was seen in the ED recently for UTI, unsure if patient received treatment outpatient for the UTI.I saw a culture that showed escherichia coli.     Patient currently seen and examined at bedside.  Patient states \"I came in to get help from my alcohol use\".  She states that she was sober however she relapsed over the holidays.  She has complaints of nausea and vomiting for the past few days.  Currently she is denying any chest pain, shortness of breath, fever or chills.  She states that she has a history of PTSD but did not want to elaborate or discuss further.  She says that she is cold and shaky.  She currently denies taking any medications at home or history of hypertension, diabetes, heart disease, or any other health issues.    Past Medical History:     Past Medical History:   Diagnosis Date    Hypokalemia     IBS (irritable bowel syndrome)         Past Surgical History:     History reviewed. No pertinent surgical history.     Medications Prior to Admission:     Prior to Admission medications    Medication Sig Start Date End Date Taking? Authorizing Provider   OLANZapine (ZYPREXA) 5 MG tablet Take 1 tablet by mouth nightly 11/15/24 11/15/25 Yes Michael Mathews MD   tretinoin (RETIN-A) 0.05 % cream Apply topically nightly.  Patient not taking: Reported on 1/10/2025    iMchael Mathews MD   prazosin (MINIPRESS) 1 MG capsule Take 1 capsule by mouth nightly  Patient not taking: Reported on 1/10/2025    Michael Mathews MD   buPROPion (WELLBUTRIN XL) 150 MG extended release tablet Take 3 tablets by mouth every morning  Patient not taking: Reported on 1/10/2025    Michael Mathews MD   cephALEXin (KEFLEX) 500 MG capsule Take 1 capsule by mouth 2 times daily For 7  SpO2 95%   BMI 15.34 kg/m²   Temp (24hrs), Av.4 °F (38 °C), Min:98 °F (36.7 °C), Max:102.2 °F (39 °C)    No results for input(s): \"POCGLU\" in the last 72 hours.    Intake/Output Summary (Last 24 hours) at 1/10/2025 1353  Last data filed at 1/10/2025 1257  Gross per 24 hour   Intake --   Output 200 ml   Net -200 ml       Physical Exam  Constitutional:       Appearance: Normal appearance. She is normal weight. She is ill-appearing.   HENT:      Head: Normocephalic and atraumatic.      Nose: Nose normal.      Mouth/Throat:      Mouth: Mucous membranes are moist.   Eyes:      Extraocular Movements: Extraocular movements intact.      Conjunctiva/sclera: Conjunctivae normal.      Pupils: Pupils are equal, round, and reactive to light.   Cardiovascular:      Rate and Rhythm: Regular rhythm. Tachycardia present.      Pulses: Normal pulses.      Heart sounds: Normal heart sounds. No murmur heard.     No friction rub. No gallop.   Pulmonary:      Effort: Pulmonary effort is normal.      Breath sounds: Normal breath sounds. No wheezing or rhonchi.   Abdominal:      General: Abdomen is flat. Bowel sounds are normal.      Palpations: Abdomen is soft.      Tenderness: There is abdominal tenderness. There is no guarding.   Musculoskeletal:         General: Normal range of motion.      Cervical back: Normal range of motion and neck supple.      Right lower leg: No edema.      Left lower leg: No edema.   Skin:     General: Skin is warm and dry.      Capillary Refill: Capillary refill takes less than 2 seconds.   Neurological:      General: No focal deficit present.      Mental Status: She is alert and oriented to person, place, and time. Mental status is at baseline.      Motor: No weakness.      Comments: Patient visibly shaking   Psychiatric:         Mood and Affect: Mood is anxious.         Behavior: Behavior is agitated.         Investigations:      Laboratory Testing:  Recent Results (from the past 24 hour(s))   CBC with

## 2025-01-10 NOTE — PROGRESS NOTES
Blood pressure and temp re-checked, temp is 100.9 and bld pressure is 93/62, Haven CLEVELAND NP updated

## 2025-01-10 NOTE — PLAN OF CARE
Pt admitted with desire to stop drinking, low bld pressure and elevated temp and heart rate, pls refer to flow sheet, fluid bolus and IV abx, supplemental potassium given for 2.9 and 3.2 levels, plans for discharge home when medically stable     Problem: Pain  Goal: Verbalizes/displays adequate comfort level or baseline comfort level  Outcome: Progressing  .Patient having pain on their head and rates it a 4. Pain interventions includerelaxation techniques. Patients goal for pain relief is  none . The need for pain and symptom management will be considered in the discharge planning process to ensure patients comfort.       Problem: Safety - Adult  Goal: Free from fall injury  Outcome: Progressing  Pt is a high fall risk per falls risk assessment. Remains free from falls and injuries, call light at reach and utilized appropriately. Bed in lowest position with wheels locked and alarm armed. Personal items that are used frequently are within reach. Up to bathroom with SBA, no attempts to get out of bed unassisted noted, pt located near nurses station w/freq rounding, will continue to monitor and educate as needed    Problem: Discharge Planning  Goal: Discharge to home or other facility with appropriate resources  Outcome: Progressing

## 2025-01-10 NOTE — PROGRESS NOTES
Haven CLEVELAND NP notified of most resent blood pressure 86/51, temp 102.2, pulse 115, tylenol given @1159 for 101 temp and also received first dose of IV rocephin, orders placed and implemented

## 2025-01-10 NOTE — FLOWSHEET NOTE
Haven CLEVELAND NP updated on patient's most recent vitals sign, informed that 0.9% NS bolus nearing end of infusion, waiting reply/orders     01/10/25 1522   Vital Signs   Temp (!) 101.7 °F (38.7 °C)   Temp Source Oral   Pulse (!) 113   Heart Rate Source Monitor   Respirations 16   BP (!) 80/47   MAP (Calculated) 58   MAP (mmHg) (!) 59   BP Location Right upper arm   Patient Position Lying right side

## 2025-01-10 NOTE — PROGRESS NOTES
Pharmacy Medication Review    The patient's list of current home medications has been reviewed.     PHYSICIANS AND NURSE PRACTITIONERS: please note there is no Transitions of Care/Med Rec Pharmacist available to address any discrepancies on inpatient orders. It is the responsibility of the attending provider to review the updates made to the home med list and adjust inpatient orders as appropriate.        Source(s) of information:Care Everywhere, [facility] refill report, [facility] pharmacy and [facility] pharmacy        Based on information provided by the above source(s), I have updated the patient's home med list as described below.     Removed   Multiple Vitamins-Minerals (THERAPEUTIC MULTIVITAMIN-MINERALS) tablet  potassium chloride (KLOR-CON M) 20 MEQ extended release tablet  famotidine (PEPCID) 20 MG tablet     Added tretinoin (RETIN-A) 0.05 % cream  prazosin (MINIPRESS) 1 MG capsule  buPROPion (WELLBUTRIN XL) 150 MG extended release tablet  cephALEXin (KEFLEX) 500 MG capsule     Adjusted   None      Other notes:   Patient is non- compliant with her medication. I verified with 2 different pharmacy that patient never filled or has not picked up medication.     Are any of the medications noted above considered a 'high alert' medication? no      Is the patient on warfarin at home? No          Please feel free to call me with any questions about this encounter. Thank you.      Elvis aCnchola, pharmacy technician  [facility]  Phone:  [phone]      Electronically signed by Elvis Canchola on 1/10/2025 at 11:12 AM   Note: co-signature by the pharmacist only acknowledges that I have performed a medication review and does not attest to an evaluation of this medication review.      Prior to Admission medications    Medication Sig   tretinoin (RETIN-A) 0.05 % cream Apply topically nightly.  Patient not taking: Reported on 1/10/2025     prazosin (MINIPRESS) 1 MG capsule Take 1 capsule by mouth nightly  Patient  not taking: Reported on 1/10/2025     buPROPion (WELLBUTRIN XL) 150 MG extended release tablet Take 3 tablets by mouth every morning  Patient not taking: Reported on 1/10/2025   cephALEXin (KEFLEX) 500 MG capsule Take 1 capsule by mouth 2 times daily For 7 days  Patient not taking: Reported on 1/10/2025     linaclotide (LINZESS) 145 MCG capsule Take 1 capsule by mouth every morning (before breakfast)  Patient not taking: Reported on 1/10/2025     omeprazole (PRILOSEC) 20 MG delayed release capsule Take 1 capsule by mouth daily  Patient not taking: Reported on 1/10/2025   magnesium oxide (MAG-OX) 400 MG tablet Take 1 tablet by mouth daily  Patient not taking: Reported on 1/10/2025     OLANZapine (ZYPREXA) 5 MG tablet Take 1 tablet by mouth nightly  Patient not taking: Reported on 1/10/2025

## 2025-01-10 NOTE — PROGRESS NOTES
Pt arrived to room 1001 via stretcher, transferred to bed with SBA, gait unsteady,  c/o headache, otherwise denies pain/discomfort, telemetry applied, vitals obtained, oriented to room, data base and admission assessment initiated

## 2025-01-10 NOTE — ED PROVIDER NOTES
EMERGENCY DEPARTMENT ENCOUNTER    Pt Name: Ese Harding  MRN: [id number]  Birthdate 1995  Date of evaluation: 1/10/25  CHIEF COMPLAINT       Chief Complaint   Patient presents with    Alcohol Intoxication     Pt states she has been drinking for days and wants to go to detox     HISTORY OF PRESENT ILLNESS   HPI   The patient is a 29-year-old female who presented to the emergency department secondary to alcohol intoxication.  Patient has been drinking for days and wants to go to detox.  Patient complains of nausea and vomiting.  Patient says she was previously sober but relapsed over the holidays.  Patient complains nausea and vomiting.  Patient Nuys chest pain, shortness of breath, fevers or chills      REVIEW OF SYSTEMS     Review of Systems   Constitutional:  Negative for chills, diaphoresis and fever.   HENT:  Negative for congestion, ear pain and facial swelling.    Eyes:  Negative for pain, discharge and visual disturbance.   Respiratory:  Negative for chest tightness and shortness of breath.    Cardiovascular:  Negative for chest pain and palpitations.   Gastrointestinal:  Positive for nausea and vomiting. Negative for abdominal distention and abdominal pain.   Genitourinary:  Negative for difficulty urinating and flank pain.   Musculoskeletal:  Negative for back pain.   Skin:  Negative for wound.   Neurological:  Negative for dizziness, light-headedness and headaches.     PASTMEDICAL HISTORY     Past Medical History:   Diagnosis Date    Hypokalemia     IBS (irritable bowel syndrome)      Past Problem List  Patient Active Problem List   Diagnosis Code    Hypokalemia E87.6    Acute alcohol intoxication with alcoholism ([facility]) F10.229    Irritable bowel syndrome K58.9    Polysubstance abuse ([facility]) F19.10    Alcohol abuse with withdrawal, unspecified ([facility]) F10.139     SURGICAL HISTORY     History reviewed. No pertinent surgical history.  CURRENT MEDICATIONS       Current Discharge Medication List        CONTINUE        PROCEDURES:    Procedures         DATA FOR LAB AND RADIOLOGY TESTS ORDERED BELOW ARE REVIEWED BY THE ED CLINICIAN:    RADIOLOGY: All x-rays, CT, MRI, and formal ultrasound images (except ED bedside ultrasound) are read by the radiologist, see reports below, unless otherwise noted in MDM or here.  Reports below are reviewed by myself.  No orders to display       LABS: Lab orders shown below, the results are reviewed by myself, and all abnormals are listed below.  Labs Reviewed   CBC WITH AUTO DIFFERENTIAL - Abnormal; Notable for the following components:       Result Value    RDW 15.9 (*)     Neutrophils % 83 (*)     Lymphocytes % 10 (*)     Lymphocytes Absolute 0.53 (*)     All other components within normal limits   COMPREHENSIVE METABOLIC PANEL - Abnormal; Notable for the following components:    Potassium 2.9 (*)     Chloride 96 (*)     Glucose 107 (*)     All other components within normal limits   ETHANOL - Abnormal; Notable for the following components:    Ethanol Lvl 93 (*)     All other components within normal limits   URINE DRUG SCREEN - Abnormal; Notable for the following components:    Benzodiazepine Screen, Urine POSITIVE (*)     Cannabinoid Scrn, Ur POSITIVE (*)     All other components within normal limits   URINALYSIS WITH MICROSCOPIC - Abnormal; Notable for the following components:    Turbidity UA Cloudy (*)     pH, Urine 8.5 (*)     Protein, UA 2+ (*)     Leukocyte Esterase, Urine TRACE (*)     Bacteria, UA MANY (*)     All other components within normal limits   MAGNESIUM   PREGNANCY, URINE   VITAMIN B12 & FOLATE   URINE DRUG SCREEN   POTASSIUM       Vitals Reviewed:    Vitals:    01/10/25 0928 01/10/25 0948 01/10/25 1015 01/10/25 1130   BP: 106/68 106/81 104/66 101/63   Pulse: (!) 139   (!) 139   Resp: 24   22   Temp:    (!) 101 °F (38.3 °C)   TempSrc:    Oral   SpO2:    98%   Weight:       Height:         MEDICATIONS GIVEN TO PATIENT THIS ENCOUNTER:  Orders Placed This Encounter

## 2025-01-10 NOTE — PROGRESS NOTES
Pt refused lovenox, states that she doesn't have time for it, writer explained the purpose of this medication is to minimize chances of developing blood clots, no change in decision, lovenox held

## 2025-01-11 ENCOUNTER — APPOINTMENT (OUTPATIENT)
Dept: GENERAL RADIOLOGY | Age: 30
DRG: 770 | End: 2025-01-11
Payer: MEDICAID

## 2025-01-11 VITALS
WEIGHT: 110 LBS | DIASTOLIC BLOOD PRESSURE: 75 MMHG | HEIGHT: 71 IN | BODY MASS INDEX: 15.4 KG/M2 | OXYGEN SATURATION: 98 % | RESPIRATION RATE: 16 BRPM | TEMPERATURE: 98.6 F | HEART RATE: 96 BPM | SYSTOLIC BLOOD PRESSURE: 97 MMHG

## 2025-01-11 LAB
ANION GAP SERPL CALCULATED.3IONS-SCNC: 9 MMOL/L (ref 9–16)
BASOPHILS # BLD: 0 K/UL (ref 0–0.2)
BASOPHILS NFR BLD: 0 %
BUN SERPL-MCNC: 14 MG/DL (ref 6–20)
CALCIUM SERPL-MCNC: 7.3 MG/DL (ref 8.6–10.4)
CHLORIDE SERPL-SCNC: 104 MMOL/L (ref 98–107)
CO2 SERPL-SCNC: 22 MMOL/L (ref 20–31)
CREAT SERPL-MCNC: 0.7 MG/DL (ref 0.5–0.9)
EOSINOPHIL # BLD: 0 K/UL (ref 0–0.4)
EOSINOPHILS RELATIVE PERCENT: 0 % (ref 1–4)
ERYTHROCYTE [DISTWIDTH] IN BLOOD BY AUTOMATED COUNT: 15.7 % (ref 11.8–14.4)
GFR, ESTIMATED: >90 ML/MIN/1.73M2
GLUCOSE SERPL-MCNC: 90 MG/DL (ref 74–99)
HCT VFR BLD AUTO: 28.7 % (ref 36.3–47.1)
HCT VFR BLD AUTO: 32.9 % (ref 36.3–47.1)
HGB BLD-MCNC: 10.4 G/DL (ref 11.9–15.1)
HGB BLD-MCNC: 9.6 G/DL (ref 11.9–15.1)
IMM GRANULOCYTES # BLD AUTO: 0 K/UL (ref 0–0.3)
IMM GRANULOCYTES NFR BLD: 0 %
LACTATE BLDV-SCNC: 0.7 MMOL/L (ref 0.5–1.9)
LYMPHOCYTES NFR BLD: 0.83 K/UL (ref 1–4.8)
LYMPHOCYTES RELATIVE PERCENT: 9 % (ref 24–44)
MAGNESIUM SERPL-MCNC: 1.4 MG/DL (ref 1.6–2.6)
MAGNESIUM SERPL-MCNC: 2.2 MG/DL (ref 1.6–2.6)
MCH RBC QN AUTO: 30.8 PG (ref 25.2–33.5)
MCHC RBC AUTO-ENTMCNC: 33.4 G/DL (ref 28.4–34.8)
MCV RBC AUTO: 92 FL (ref 82.6–102.9)
MONOCYTES NFR BLD: 0.83 K/UL (ref 0.2–0.8)
MONOCYTES NFR BLD: 9 % (ref 1–7)
MORPHOLOGY: ABNORMAL
NEUTROPHILS NFR BLD: 82 % (ref 36–66)
NEUTS SEG NFR BLD: 7.54 K/UL (ref 1.8–7.7)
NRBC BLD-RTO: 0 PER 100 WBC
PLATELET # BLD AUTO: 164 K/UL (ref 138–453)
PMV BLD AUTO: 9.3 FL (ref 8.1–13.5)
POTASSIUM SERPL-SCNC: 3 MMOL/L (ref 3.7–5.3)
RBC # BLD AUTO: 3.12 M/UL (ref 3.95–5.11)
SODIUM SERPL-SCNC: 134 MMOL/L (ref 136–145)
WBC OTHER # BLD: 9.2 K/UL (ref 3.5–11.3)

## 2025-01-11 PROCEDURE — 2500000003 HC RX 250 WO HCPCS

## 2025-01-11 PROCEDURE — 6370000000 HC RX 637 (ALT 250 FOR IP): Performed by: NURSE PRACTITIONER

## 2025-01-11 PROCEDURE — 80048 BASIC METABOLIC PNL TOTAL CA: CPT

## 2025-01-11 PROCEDURE — 83605 ASSAY OF LACTIC ACID: CPT

## 2025-01-11 PROCEDURE — 71045 X-RAY EXAM CHEST 1 VIEW: CPT

## 2025-01-11 PROCEDURE — 2500000003 HC RX 250 WO HCPCS: Performed by: NURSE PRACTITIONER

## 2025-01-11 PROCEDURE — 99232 SBSQ HOSP IP/OBS MODERATE 35: CPT | Performed by: FAMILY MEDICINE

## 2025-01-11 PROCEDURE — 87040 BLOOD CULTURE FOR BACTERIA: CPT

## 2025-01-11 PROCEDURE — 83735 ASSAY OF MAGNESIUM: CPT

## 2025-01-11 PROCEDURE — 85018 HEMOGLOBIN: CPT

## 2025-01-11 PROCEDURE — 6360000002 HC RX W HCPCS: Performed by: NURSE PRACTITIONER

## 2025-01-11 PROCEDURE — 2580000003 HC RX 258: Performed by: EMERGENCY MEDICINE

## 2025-01-11 PROCEDURE — 36415 COLL VENOUS BLD VENIPUNCTURE: CPT

## 2025-01-11 PROCEDURE — 85025 COMPLETE CBC W/AUTO DIFF WBC: CPT

## 2025-01-11 PROCEDURE — 6370000000 HC RX 637 (ALT 250 FOR IP)

## 2025-01-11 PROCEDURE — 6360000002 HC RX W HCPCS

## 2025-01-11 PROCEDURE — 2580000003 HC RX 258: Performed by: NURSE PRACTITIONER

## 2025-01-11 PROCEDURE — 85014 HEMATOCRIT: CPT

## 2025-01-11 RX ADMIN — WATER 1000 MG: 1 INJECTION INTRAMUSCULAR; INTRAVENOUS; SUBCUTANEOUS at 12:18

## 2025-01-11 RX ADMIN — POTASSIUM CHLORIDE 10 MEQ: 7.46 INJECTION, SOLUTION INTRAVENOUS at 05:29

## 2025-01-11 RX ADMIN — MAGNESIUM SULFATE HEPTAHYDRATE 2000 MG: 40 INJECTION, SOLUTION INTRAVENOUS at 05:28

## 2025-01-11 RX ADMIN — POTASSIUM CHLORIDE 10 MEQ: 7.46 INJECTION, SOLUTION INTRAVENOUS at 09:08

## 2025-01-11 RX ADMIN — SODIUM CHLORIDE: 9 INJECTION, SOLUTION INTRAVENOUS at 05:13

## 2025-01-11 RX ADMIN — FAMOTIDINE 20 MG: 10 INJECTION, SOLUTION INTRAVENOUS at 09:12

## 2025-01-11 RX ADMIN — CHLORDIAZEPOXIDE HYDROCHLORIDE 10 MG: 10 CAPSULE ORAL at 09:12

## 2025-01-11 RX ADMIN — POTASSIUM CHLORIDE 10 MEQ: 7.46 INJECTION, SOLUTION INTRAVENOUS at 10:07

## 2025-01-11 RX ADMIN — POTASSIUM CHLORIDE 10 MEQ: 7.46 INJECTION, SOLUTION INTRAVENOUS at 07:24

## 2025-01-11 RX ADMIN — POTASSIUM CHLORIDE 10 MEQ: 7.46 INJECTION, SOLUTION INTRAVENOUS at 06:25

## 2025-01-11 RX ADMIN — THIAMINE HYDROCHLORIDE: 100 INJECTION, SOLUTION INTRAMUSCULAR; INTRAVENOUS at 12:16

## 2025-01-11 RX ADMIN — POTASSIUM CHLORIDE 10 MEQ: 7.46 INJECTION, SOLUTION INTRAVENOUS at 11:07

## 2025-01-11 RX ADMIN — ACETAMINOPHEN 650 MG: 325 TABLET ORAL at 01:38

## 2025-01-11 ASSESSMENT — ENCOUNTER SYMPTOMS
NAUSEA: 0
VOMITING: 0
COUGH: 0
CHEST TIGHTNESS: 0
WHEEZING: 0
ABDOMINAL PAIN: 0
DIARRHEA: 0
SHORTNESS OF BREATH: 0
CONSTIPATION: 0
BLOOD IN STOOL: 0
RHINORRHEA: 0

## 2025-01-11 NOTE — CARE COORDINATION
Social work; if pt has had a change of decision from ED will offer her the same options for treatment provided in ED yesterday. Pt has [place] Medicaid just moved here from [place] and therefore treatment for an [place] resident [place] may prove difficult. She is aware she must change her address and close her [place] Medicaid and apply for [place] Medicaid. Not suire of her follow up patterns. Will ask Help and place voicemail message today for possible Monday intervention to offer assistance to patient with that transition. Betty sood

## 2025-01-11 NOTE — FLOWSHEET NOTE
Urine culture (+) for e-coli     01/11/25 1350   Treatment Team Notification   Reason for Communication Evaluate  (urine culture)   Name of Team Member Notified Seco   Treatment Team Role Advanced Practice Nurse   Method of Communication Secure Message   Response Waiting for response   Notification Time 8064

## 2025-01-11 NOTE — PROGRESS NOTES
Pt at nurses station fully clothed, IV and telemetry removed per patient, states \"boyfriend\" is on the way to pick her up, attempted to convince patient to continue care, states she is ready to go home, directions provided to front entrance per her request, Dr Alves updated

## 2025-01-11 NOTE — PROGRESS NOTES
[facility]  Office: [phone]  Ignacio Marc DO, Howard Jaquez DO, Frank Mejía DO, Néstor Rizvi DO, Davina Ling MD, Melody Andrade MD, Elayne Alves MD, Pooja Younger MD,  Richard Rendon MD, Ousmane Almanza MD, Christiana Webb MD,  Elizabeth Cardona DO, Polina Hoskins MD, Shane Carranza MD, Tung Marc DO, Dominique Lowery MD,  Walter Benjamin DO, Damaris Dumont MD, Tara Verde MD, Keke Wu MD, Tayo Knox MD,  Tano Martinez MD, Cyndee Trivedi MD, Lilliam Reyes MD, Devon Michelle MD, Ty Martin MD, Alessandro Zaragoza MD, Juwan Downey DO, Adrian Garcia MD, Elizabeth Best MD, Mohsin Reza, MD, Shirley Waterhouse, CNP,  Lorrie Porter CNP, Juwan Pfeiffer, CNP,  Betty Couch, KITTY, Afsaneh Trujillo, CNP, Senait Sena, CNP, Sharri Campbell, CNP, Shawanda Montejo, CNP, Darleen Mccauley, PA-C, Mariana Hemphill PA-C, Sophie Ponce, CNP, Levi Tovar, CNP,  Lulu Riley, CNP, Haven Dowling, CNP, Elaine Cheatham, CNP,  Marisol Alicea, DARLING, Christie Yoon, CNP       [facility]      Daily Progress Note     Admit Date: 1/10/2025  Bed/Room No.  1001/1001-02  Admitting Physician : Elayne Alves MD  Code Status :Full Code  Hospital Day:  LOS: 1 day   Chief Complaint:     Chief Complaint   Patient presents with    Alcohol Intoxication     Pt states she has been drinking for days and wants to go to detox     Principal Problem:    Alcohol abuse with withdrawal, unspecified (HCC)  Active Problems:    Hypokalemia    Polysubstance abuse (HCC)    UTI (urinary tract infection)  Resolved Problems:    * No resolved hospital problems. *    Subjective :        Interval History/Significant events :  01/11/25    Patient remains alert, oriented.  She has tremors.  Patient has generalized fatigue, decreased appetite.  Patient was febrile overnight with Tmax 102.2.  She denies any breathing difficulty, cough.  She is on room air.  Vitals -low blood pressure  Labs / test results

## 2025-01-11 NOTE — PLAN OF CARE
Pt febrile. Temp 102 around midnight. Pt thermostat turned down and blankets removed. Recheck was 101.8. Prn tylenol administered. This morning, temp 97.3. Pt BP remains \"soft\" and HR elevated.   Remote NP notified. No new orders. Advised to monitor unless pt symptomatic.   UTI - IV rocephin. Urine cultures pending.   Pt detoxing. Had PO ativan once.  Morning labs resulted, Mag and K being replaced, see mar.    Problem: Metabolic/Fluid and Electrolytes - Adult  Goal: Electrolytes maintained within normal limits  Reactivated     Problem: Safety - Adult  Goal: Free from fall injury  1/11/2025 0214 by Marielena Bautista, RN  Outcome: Progressing     Problem: Discharge Planning  Goal: Discharge to home or other facility with appropriate resources  1/11/2025 0214 by Marielena Bautista, RN  Outcome: Progressing

## 2025-01-11 NOTE — DISCHARGE SUMMARY
[facility]  Office: [phone]  Ignacio Marc DO, Howard Jaquez DO, Frank Mejía DO, Néstor Rizvi DO, Davina Ling MD, Melody Andrade MD, Elayne Alves MD, Pooja Younger MD,  Richard Rendon MD, Ousmane Almanza MD, Christiana Webb MD,  Elizabeth Cardona DO, Polina Hoskins MD, Shane Carranza MD, Tung Marc DO, Dominique Lowery MD,  Walter Benjamin DO, Damaris Dumont MD, Tara Verde MD, Keke Wu MD, Tayo Knox MD,  Tano Martinez MD, Cyndee Trivedi MD, Lilliam Reyes MD, Devon Michelle MD, Ty Martin MD, Alessandro Zaragoza MD, Juwan Downey DO, Adrian Garcia MD, Elizabeth Best MD, Mohsin Reza, MD, Shirley Waterhouse, CNP,  Lorrie Porter CNP, Juwan Pfeiffer, DARLING,  Betty Couch, KITTY, Afsaneh Trujillo, CNP, Senait Sena, CNP, Sharri Campbell, CNP, Shawanda Montejo, CNP, Darleen Mccauley, PA-C, Mariana Hemphill PA-C, Sophie Ponce, CNP, Levi Tovar, CNP,  Lulu Riley, CNP, Haven Dowling, CNP, Elaine Cheatham, CNP,  Marisol Alicea, DARLING, Christie Yoon, CNP                [facility]      Discharge Summary     Patient ID: Ese Harding  :  1995   MRN: [id number]     ACCOUNT:  [id number]   Patient Location : [address] [place] [address]  Patient's PCP: Wale Zhang MD  Admit Date: 1/10/2025   Discharge Date:  25    Length of Stay: 1  Code Status:  Full Code  Admitting Physician: Elayne Alves MD  Discharge Physician: Elayne Alves MD     Active Discharge Diagnosis :     Primary Problem  Alcohol abuse with withdrawal, unspecified (HCC)      Hospital Problems  Active Hospital Problems    Diagnosis Date Noted    Alcohol abuse with withdrawal, unspecified (HCC) [F10.139] 01/10/2025    UTI (urinary tract infection) [N39.0] 01/10/2025    Hypokalemia [E87.6] 2024    Polysubstance abuse (HCC) [F19.10] 2024       Admission Condition:  fair     Discharged Condition: stable    Hospital Stay:     Hospital Course:  Ese Harding is a 29 y.o. female who was

## 2025-01-11 NOTE — PROGRESS NOTES
Pt yelling in room, demanding to \"go home now\" writer in and offered encouragement, pt calmed from yelling, states she wants to leave against medical advice, Dr Alves notified, instructed to continue current treatment, no plans for discharge today, patient made aware

## 2025-01-11 NOTE — CARE COORDINATION
Case Management Assessment  Initial Evaluation    Date/Time of Evaluation: 1/11/2025 2:27 PM  Assessment Completed by: ALEJANDRA TONG RN    If patient is discharged prior to next notation, then this note serves as note for discharge by case management.    Patient Name: Ese Harding                   YOB: 1995  Diagnosis: Alcohol abuse with withdrawal, unspecified (HCC) [F10.139]                   Date / Time: 1/10/2025  6:42 AM    Patient Admission Status: Inpatient   Readmission Risk (Low < 19, Mod (19-27), High > 27): Readmission Risk Score: 12.2    Current PCP: Wale Zhang MD  PCP verified by CM? Yes    Chart Reviewed: Yes      History Provided by: Patient  Patient Orientation: Alert and Oriented, Person, Place, Situation, Self    Patient Cognition: Alert    Hospitalization in the last 30 days (Readmission):  No    If yes, Readmission Assessment in  Navigator will be completed.    Advance Directives:      Code Status: Full Code   Patient's Primary Decision Maker is: Legal Next of Kin      Discharge Planning:    Patient lives with: Spouse/Significant Other Type of Home: House  Primary Care Giver: Self  Patient Support Systems include: Parent, Spouse/Significant Other   Current Financial resources: None  Current community resources: None  Current services prior to admission: None            Current DME:              Type of Home Care services:  None    ADLS  Prior functional level: Independent in ADLs/IADLs  Current functional level: Independent in ADLs/IADLs    PT AM-PAC:   /24  OT AM-PAC:   /24    Family can provide assistance at DC: No  Would you like Case Management to discuss the discharge plan with any other family members/significant others, and if so, who? Yes (parent)  Plans to Return to Present Housing: Yes  Other Identified Issues/Barriers to RETURNING to current housing: medical condition  Potential Assistance needed at discharge: N/A            Potential DME:    Patient

## 2025-01-12 LAB
MICROORGANISM SPEC CULT: ABNORMAL
MICROORGANISM SPEC CULT: NORMAL
MICROORGANISM SPEC CULT: NORMAL
SERVICE CMNT-IMP: ABNORMAL
SERVICE CMNT-IMP: NORMAL
SERVICE CMNT-IMP: NORMAL
SPECIMEN DESCRIPTION: ABNORMAL
SPECIMEN DESCRIPTION: NORMAL
SPECIMEN DESCRIPTION: NORMAL

## 2025-01-13 LAB
EKG ATRIAL RATE: 130 BPM
EKG P-R INTERVAL: 72 MS
EKG Q-T INTERVAL: 396 MS
EKG QRS DURATION: 78 MS
EKG QTC CALCULATION (BAZETT): 582 MS
EKG R AXIS: 71 DEGREES
EKG T AXIS: 80 DEGREES
EKG VENTRICULAR RATE: 130 BPM

## 2025-01-13 PROCEDURE — 93010 ELECTROCARDIOGRAM REPORT: CPT | Performed by: INTERNAL MEDICINE

## 2025-01-16 LAB
MICROORGANISM SPEC CULT: NORMAL
MICROORGANISM SPEC CULT: NORMAL
SERVICE CMNT-IMP: NORMAL
SERVICE CMNT-IMP: NORMAL
SPECIMEN DESCRIPTION: NORMAL
SPECIMEN DESCRIPTION: NORMAL

## 2025-01-25 ENCOUNTER — HOSPITAL ENCOUNTER (INPATIENT)
Age: 30
LOS: 1 days | Discharge: LEFT BEFORE DISCHARGE | DRG: 770 | End: 2025-01-27
Attending: STUDENT IN AN ORGANIZED HEALTH CARE EDUCATION/TRAINING PROGRAM | Admitting: INTERNAL MEDICINE
Payer: MEDICAID

## 2025-01-25 DIAGNOSIS — F10.920 ACUTE ALCOHOLIC INTOXICATION WITHOUT COMPLICATION (HCC): Primary | ICD-10-CM

## 2025-01-25 DIAGNOSIS — E87.6 HYPOKALEMIA: ICD-10-CM

## 2025-01-25 DIAGNOSIS — F10.939 ALCOHOL WITHDRAWAL SYNDROME WITH COMPLICATION (HCC): ICD-10-CM

## 2025-01-25 LAB
ANION GAP SERPL CALCULATED.3IONS-SCNC: 17 MMOL/L (ref 9–16)
BUN SERPL-MCNC: 5 MG/DL (ref 6–20)
CALCIUM SERPL-MCNC: 8.5 MG/DL (ref 8.6–10.4)
CHLORIDE SERPL-SCNC: 84 MMOL/L (ref 98–107)
CO2 SERPL-SCNC: 31 MMOL/L (ref 20–31)
CREAT SERPL-MCNC: 0.9 MG/DL (ref 0.5–0.9)
GFR, ESTIMATED: >90 ML/MIN/1.73M2
GLUCOSE SERPL-MCNC: 90 MG/DL (ref 74–99)
MAGNESIUM SERPL-MCNC: 2 MG/DL (ref 1.6–2.6)
POTASSIUM SERPL-SCNC: 2.6 MMOL/L (ref 3.7–5.3)
SODIUM SERPL-SCNC: 132 MMOL/L (ref 136–145)

## 2025-01-25 PROCEDURE — 6360000002 HC RX W HCPCS: Performed by: NURSE PRACTITIONER

## 2025-01-25 PROCEDURE — 93005 ELECTROCARDIOGRAM TRACING: CPT | Performed by: NURSE PRACTITIONER

## 2025-01-25 PROCEDURE — 83735 ASSAY OF MAGNESIUM: CPT

## 2025-01-25 PROCEDURE — G0480 DRUG TEST DEF 1-7 CLASSES: HCPCS

## 2025-01-25 PROCEDURE — 80048 BASIC METABOLIC PNL TOTAL CA: CPT

## 2025-01-25 PROCEDURE — 96366 THER/PROPH/DIAG IV INF ADDON: CPT

## 2025-01-25 PROCEDURE — 99285 EMERGENCY DEPT VISIT HI MDM: CPT

## 2025-01-25 PROCEDURE — 96365 THER/PROPH/DIAG IV INF INIT: CPT

## 2025-01-25 RX ORDER — POTASSIUM CHLORIDE 7.45 MG/ML
10 INJECTION INTRAVENOUS ONCE
Status: COMPLETED | OUTPATIENT
Start: 2025-01-25 | End: 2025-01-26

## 2025-01-25 RX ORDER — POTASSIUM CHLORIDE 7.45 MG/ML
10 INJECTION INTRAVENOUS ONCE
Status: COMPLETED | OUTPATIENT
Start: 2025-01-25 | End: 2025-01-25

## 2025-01-25 RX ADMIN — POTASSIUM CHLORIDE 10 MEQ: 7.46 INJECTION, SOLUTION INTRAVENOUS at 23:55

## 2025-01-25 RX ADMIN — POTASSIUM CHLORIDE 10 MEQ: 7.46 INJECTION, SOLUTION INTRAVENOUS at 22:54

## 2025-01-25 ASSESSMENT — PAIN - FUNCTIONAL ASSESSMENT: PAIN_FUNCTIONAL_ASSESSMENT: NONE - DENIES PAIN

## 2025-01-26 PROBLEM — F10.129 ALCOHOL INTOXICATION WITH BLOOD LEVEL OVER 0.3 (HCC): Status: ACTIVE | Noted: 2025-01-26

## 2025-01-26 PROBLEM — Z72.0 TOBACCO ABUSE: Status: ACTIVE | Noted: 2025-01-26

## 2025-01-26 LAB
BASOPHILS # BLD: 0.05 K/UL (ref 0–0.2)
BASOPHILS NFR BLD: 1 % (ref 0–2)
BILIRUB UR QL STRIP: NEGATIVE
CLARITY UR: CLEAR
COLOR UR: YELLOW
COMMENT: ABNORMAL
EOSINOPHIL # BLD: <0.03 K/UL (ref 0–0.44)
EOSINOPHILS RELATIVE PERCENT: 1 % (ref 1–4)
ERYTHROCYTE [DISTWIDTH] IN BLOOD BY AUTOMATED COUNT: 14.6 % (ref 11.8–14.4)
ETHANOL PERCENT: 0.4 %
ETHANOLAMINE SERPL-MCNC: 400 MG/DL (ref 0–0.08)
GLUCOSE BLD-MCNC: 90 MG/DL (ref 65–105)
GLUCOSE UR STRIP-MCNC: NEGATIVE MG/DL
HCG UR QL: NEGATIVE
HCT VFR BLD AUTO: 30.1 % (ref 36.3–47.1)
HGB BLD-MCNC: 10.4 G/DL (ref 11.9–15.1)
HGB UR QL STRIP.AUTO: NEGATIVE
IMM GRANULOCYTES # BLD AUTO: 0 K/UL (ref 0–0.3)
IMM GRANULOCYTES NFR BLD: 0 %
KETONES UR STRIP-MCNC: NEGATIVE MG/DL
LEUKOCYTE ESTERASE UR QL STRIP: NEGATIVE
LYMPHOCYTES NFR BLD: 1.07 K/UL (ref 1.1–3.7)
LYMPHOCYTES RELATIVE PERCENT: 26 % (ref 24–43)
MCH RBC QN AUTO: 29.4 PG (ref 25.2–33.5)
MCHC RBC AUTO-ENTMCNC: 34.6 G/DL (ref 28.4–34.8)
MCV RBC AUTO: 85 FL (ref 82.6–102.9)
MONOCYTES NFR BLD: 0.63 K/UL (ref 0.1–1.2)
MONOCYTES NFR BLD: 15 % (ref 3–12)
NEUTROPHILS NFR BLD: 57 % (ref 36–65)
NEUTS SEG NFR BLD: 2.39 K/UL (ref 1.5–8.1)
NITRITE UR QL STRIP: NEGATIVE
NRBC BLD-RTO: 0 PER 100 WBC
PH UR STRIP: 8.5 [PH] (ref 5–8)
PLATELET # BLD AUTO: 336 K/UL (ref 138–453)
PMV BLD AUTO: 8.6 FL (ref 8.1–13.5)
POTASSIUM SERPL-SCNC: 3.7 MMOL/L (ref 3.7–5.3)
PROT UR STRIP-MCNC: NEGATIVE MG/DL
RBC # BLD AUTO: 3.54 M/UL (ref 3.95–5.11)
RBC # BLD: ABNORMAL 10*6/UL
SP GR UR STRIP: 1.01 (ref 1–1.03)
UROBILINOGEN UR STRIP-ACNC: NORMAL EU/DL (ref 0–1)
WBC OTHER # BLD: 4.2 K/UL (ref 3.5–11.3)

## 2025-01-26 PROCEDURE — 6360000002 HC RX W HCPCS: Performed by: INTERNAL MEDICINE

## 2025-01-26 PROCEDURE — 2580000003 HC RX 258: Performed by: STUDENT IN AN ORGANIZED HEALTH CARE EDUCATION/TRAINING PROGRAM

## 2025-01-26 PROCEDURE — 2060000000 HC ICU INTERMEDIATE R&B

## 2025-01-26 PROCEDURE — 2580000003 HC RX 258: Performed by: EMERGENCY MEDICINE

## 2025-01-26 PROCEDURE — 2500000003 HC RX 250 WO HCPCS: Performed by: INTERNAL MEDICINE

## 2025-01-26 PROCEDURE — 99223 1ST HOSP IP/OBS HIGH 75: CPT | Performed by: INTERNAL MEDICINE

## 2025-01-26 PROCEDURE — 6370000000 HC RX 637 (ALT 250 FOR IP): Performed by: STUDENT IN AN ORGANIZED HEALTH CARE EDUCATION/TRAINING PROGRAM

## 2025-01-26 PROCEDURE — 6370000000 HC RX 637 (ALT 250 FOR IP): Performed by: INTERNAL MEDICINE

## 2025-01-26 PROCEDURE — 96375 TX/PRO/DX INJ NEW DRUG ADDON: CPT

## 2025-01-26 PROCEDURE — 6360000002 HC RX W HCPCS: Performed by: EMERGENCY MEDICINE

## 2025-01-26 PROCEDURE — 6370000000 HC RX 637 (ALT 250 FOR IP): Performed by: EMERGENCY MEDICINE

## 2025-01-26 RX ORDER — LORAZEPAM 1 MG/1
3 TABLET ORAL
Status: DISCONTINUED | OUTPATIENT
Start: 2025-01-26 | End: 2025-01-27

## 2025-01-26 RX ORDER — ACETAMINOPHEN 650 MG/1
650 SUPPOSITORY RECTAL EVERY 6 HOURS PRN
Status: DISCONTINUED | OUTPATIENT
Start: 2025-01-26 | End: 2025-01-27 | Stop reason: HOSPADM

## 2025-01-26 RX ORDER — LORAZEPAM 1 MG/1
2 TABLET ORAL
Status: DISCONTINUED | OUTPATIENT
Start: 2025-01-26 | End: 2025-01-27

## 2025-01-26 RX ORDER — LORAZEPAM 1 MG/1
4 TABLET ORAL
Status: DISCONTINUED | OUTPATIENT
Start: 2025-01-26 | End: 2025-01-27

## 2025-01-26 RX ORDER — POLYETHYLENE GLYCOL 3350 17 G/17G
17 POWDER, FOR SOLUTION ORAL DAILY PRN
Status: DISCONTINUED | OUTPATIENT
Start: 2025-01-26 | End: 2025-01-27 | Stop reason: HOSPADM

## 2025-01-26 RX ORDER — 0.9 % SODIUM CHLORIDE 0.9 %
1000 INTRAVENOUS SOLUTION INTRAVENOUS ONCE
Status: COMPLETED | OUTPATIENT
Start: 2025-01-26 | End: 2025-01-26

## 2025-01-26 RX ORDER — OLANZAPINE 5 MG/1
5 TABLET ORAL NIGHTLY
Status: DISCONTINUED | OUTPATIENT
Start: 2025-01-26 | End: 2025-01-27 | Stop reason: HOSPADM

## 2025-01-26 RX ORDER — LORAZEPAM 2 MG/ML
2 INJECTION INTRAMUSCULAR
Status: DISCONTINUED | OUTPATIENT
Start: 2025-01-26 | End: 2025-01-27

## 2025-01-26 RX ORDER — SODIUM CHLORIDE 0.9 % (FLUSH) 0.9 %
5-40 SYRINGE (ML) INJECTION EVERY 12 HOURS SCHEDULED
Status: DISCONTINUED | OUTPATIENT
Start: 2025-01-26 | End: 2025-01-27 | Stop reason: HOSPADM

## 2025-01-26 RX ORDER — GAUZE BANDAGE 2" X 2"
100 BANDAGE TOPICAL DAILY
Status: DISCONTINUED | OUTPATIENT
Start: 2025-01-26 | End: 2025-01-26

## 2025-01-26 RX ORDER — LORAZEPAM 2 MG/ML
3 INJECTION INTRAMUSCULAR
Status: DISCONTINUED | OUTPATIENT
Start: 2025-01-26 | End: 2025-01-27

## 2025-01-26 RX ORDER — LORAZEPAM 2 MG/ML
4 INJECTION INTRAMUSCULAR
Status: DISCONTINUED | OUTPATIENT
Start: 2025-01-26 | End: 2025-01-27

## 2025-01-26 RX ORDER — ONDANSETRON 2 MG/ML
4 INJECTION INTRAMUSCULAR; INTRAVENOUS EVERY 6 HOURS PRN
Status: DISCONTINUED | OUTPATIENT
Start: 2025-01-26 | End: 2025-01-27 | Stop reason: HOSPADM

## 2025-01-26 RX ORDER — SODIUM CHLORIDE 9 MG/ML
INJECTION, SOLUTION INTRAVENOUS PRN
Status: DISCONTINUED | OUTPATIENT
Start: 2025-01-26 | End: 2025-01-27 | Stop reason: HOSPADM

## 2025-01-26 RX ORDER — POTASSIUM CHLORIDE 7.45 MG/ML
10 INJECTION INTRAVENOUS PRN
Status: DISCONTINUED | OUTPATIENT
Start: 2025-01-26 | End: 2025-01-27 | Stop reason: HOSPADM

## 2025-01-26 RX ORDER — ONDANSETRON 4 MG/1
4 TABLET, ORALLY DISINTEGRATING ORAL EVERY 8 HOURS PRN
Status: DISCONTINUED | OUTPATIENT
Start: 2025-01-26 | End: 2025-01-27 | Stop reason: HOSPADM

## 2025-01-26 RX ORDER — ACETAMINOPHEN 325 MG/1
650 TABLET ORAL EVERY 6 HOURS PRN
Status: DISCONTINUED | OUTPATIENT
Start: 2025-01-26 | End: 2025-01-27 | Stop reason: HOSPADM

## 2025-01-26 RX ORDER — ENOXAPARIN SODIUM 100 MG/ML
40 INJECTION SUBCUTANEOUS DAILY
Status: DISCONTINUED | OUTPATIENT
Start: 2025-01-26 | End: 2025-01-27 | Stop reason: HOSPADM

## 2025-01-26 RX ORDER — LORAZEPAM 2 MG/ML
1 INJECTION INTRAMUSCULAR
Status: DISCONTINUED | OUTPATIENT
Start: 2025-01-26 | End: 2025-01-27

## 2025-01-26 RX ORDER — SODIUM CHLORIDE, SODIUM LACTATE, POTASSIUM CHLORIDE, AND CALCIUM CHLORIDE .6; .31; .03; .02 G/100ML; G/100ML; G/100ML; G/100ML
1000 INJECTION, SOLUTION INTRAVENOUS ONCE
Status: COMPLETED | OUTPATIENT
Start: 2025-01-26 | End: 2025-01-26

## 2025-01-26 RX ORDER — MAGNESIUM SULFATE 1 G/100ML
1000 INJECTION INTRAVENOUS PRN
Status: DISCONTINUED | OUTPATIENT
Start: 2025-01-26 | End: 2025-01-27 | Stop reason: HOSPADM

## 2025-01-26 RX ORDER — SODIUM CHLORIDE 0.9 % (FLUSH) 0.9 %
5-40 SYRINGE (ML) INJECTION PRN
Status: DISCONTINUED | OUTPATIENT
Start: 2025-01-26 | End: 2025-01-27 | Stop reason: HOSPADM

## 2025-01-26 RX ORDER — LORAZEPAM 1 MG/1
1 TABLET ORAL
Status: DISCONTINUED | OUTPATIENT
Start: 2025-01-26 | End: 2025-01-27

## 2025-01-26 RX ORDER — SODIUM CHLORIDE 0.9 % (FLUSH) 0.9 %
10 SYRINGE (ML) INJECTION PRN
Status: DISCONTINUED | OUTPATIENT
Start: 2025-01-26 | End: 2025-01-27 | Stop reason: HOSPADM

## 2025-01-26 RX ORDER — POTASSIUM CHLORIDE 1500 MG/1
40 TABLET, EXTENDED RELEASE ORAL PRN
Status: DISCONTINUED | OUTPATIENT
Start: 2025-01-26 | End: 2025-01-27 | Stop reason: HOSPADM

## 2025-01-26 RX ORDER — GAUZE BANDAGE 2" X 2"
100 BANDAGE TOPICAL DAILY
Status: DISCONTINUED | OUTPATIENT
Start: 2025-01-26 | End: 2025-01-27 | Stop reason: HOSPADM

## 2025-01-26 RX ADMIN — LORAZEPAM 1 MG: 2 INJECTION INTRAMUSCULAR; INTRAVENOUS at 08:59

## 2025-01-26 RX ADMIN — OLANZAPINE 5 MG: 5 TABLET, FILM COATED ORAL at 19:30

## 2025-01-26 RX ADMIN — LORAZEPAM 2 MG: 2 INJECTION INTRAMUSCULAR; INTRAVENOUS at 18:06

## 2025-01-26 RX ADMIN — Medication 100 MG: at 08:11

## 2025-01-26 RX ADMIN — POTASSIUM BICARBONATE 40 MEQ: 782 TABLET, EFFERVESCENT ORAL at 07:25

## 2025-01-26 RX ADMIN — LORAZEPAM 2 MG: 2 INJECTION INTRAMUSCULAR; INTRAVENOUS at 21:39

## 2025-01-26 RX ADMIN — LORAZEPAM 2 MG: 2 INJECTION INTRAMUSCULAR; INTRAVENOUS at 19:30

## 2025-01-26 RX ADMIN — LORAZEPAM 1 MG: 2 INJECTION INTRAMUSCULAR; INTRAVENOUS at 13:28

## 2025-01-26 RX ADMIN — SODIUM CHLORIDE, PRESERVATIVE FREE 10 ML: 5 INJECTION INTRAVENOUS at 19:30

## 2025-01-26 RX ADMIN — Medication 100 MG: at 08:59

## 2025-01-26 RX ADMIN — LORAZEPAM 2 MG: 2 INJECTION INTRAMUSCULAR; INTRAVENOUS at 16:59

## 2025-01-26 RX ADMIN — SODIUM CHLORIDE, POTASSIUM CHLORIDE, SODIUM LACTATE AND CALCIUM CHLORIDE 1000 ML: 600; 310; 30; 20 INJECTION, SOLUTION INTRAVENOUS at 04:07

## 2025-01-26 RX ADMIN — ENOXAPARIN SODIUM 40 MG: 100 INJECTION SUBCUTANEOUS at 08:59

## 2025-01-26 RX ADMIN — LORAZEPAM 1 MG: 2 INJECTION INTRAMUSCULAR; INTRAVENOUS at 12:04

## 2025-01-26 RX ADMIN — LORAZEPAM 1 MG: 2 INJECTION INTRAMUSCULAR; INTRAVENOUS at 07:40

## 2025-01-26 RX ADMIN — SODIUM CHLORIDE 1000 ML: 9 INJECTION, SOLUTION INTRAVENOUS at 08:03

## 2025-01-26 RX ADMIN — ONDANSETRON 4 MG: 2 INJECTION INTRAMUSCULAR; INTRAVENOUS at 17:48

## 2025-01-26 RX ADMIN — LORAZEPAM 2 MG: 2 INJECTION INTRAMUSCULAR; INTRAVENOUS at 15:24

## 2025-01-26 RX ADMIN — SODIUM CHLORIDE, PRESERVATIVE FREE 10 ML: 5 INJECTION INTRAVENOUS at 08:59

## 2025-01-26 RX ADMIN — SODIUM CHLORIDE 1000 ML: 9 INJECTION, SOLUTION INTRAVENOUS at 01:44

## 2025-01-26 ASSESSMENT — LIFESTYLE VARIABLES
HOW OFTEN DO YOU HAVE A DRINK CONTAINING ALCOHOL: 4 OR MORE TIMES A WEEK
HOW MANY STANDARD DRINKS CONTAINING ALCOHOL DO YOU HAVE ON A TYPICAL DAY: 7 TO 9

## 2025-01-26 NOTE — ED NOTES
Pt arrived to ED by EMS with c/o alcohol intoxication. Ems states family called out for a safe check. EMS states when they arrived, pt was alert and intoxicated. Pt states she drinks a half a gallon of vodka a day. Pt states she is trying to stop but was still drinking tonight. Pt responses when questions asked. Pt states she uses fentanyl. Pt states her last use was last week. Pt states she has narcan at home. Pt is resting on stretcher. Call light in reach.

## 2025-01-26 NOTE — ED NOTES
Report given to YO Byrnes from [facility].   Report method by phone.   The following was reviewed with receiving RN:   Current vital signs:  BP (!) 89/57   Pulse 83   Temp 98.1 °F (36.7 °C) (Oral)   Resp 17   Ht 1.803 m (5' 11\")   Wt 54.4 kg (120 lb)   SpO2 98%   BMI 16.74 kg/m²                MEWS Score: 2     Any medication or safety alerts were reviewed. Any pending diagnostics and notifications were also reviewed, as well as any safety concerns or issues, abnormal labs, abnormal imaging, and abnormal assessment findings. Questions were answered.

## 2025-01-26 NOTE — PROGRESS NOTES
Patient calling out every 10 minutes for juice, ativan or food. Patient gets agitated if needs are not met immediately. Will call out continually until demands are met.

## 2025-01-26 NOTE — PROGRESS NOTES
Admitted from ER /PCU to room 1005 .   Pt alert and oriented.   Pt oriented to call light system and agreeable to call for assistance.    Vital signs recorded    Telemetry monitor applied.   Admission documentation started but patient shouting at writer she needs her medicine and refusing to complete until I give it to her. Explained waiting for meds to be verified as just arrived to floor; Patient going through CIWA scale stating she has nausea, tremors, and is seeing things. Ativan 1 mg given once medication verified. Patient then refusing to allow writer to ask questions until she gets juice and breakfast.   Home Meds reviewed with patient.

## 2025-01-26 NOTE — ED PROVIDER NOTES
[facility] EMERGENCY DEPARTMENT  eMERGENCY dEPARTMENT eNCOUnter      Pt Name: Ese Harding  MRN: [id number]  Birthdate 1995  Date of evaluation: 1/25/2025  Provider: JONO Uribe CNP    CHIEF COMPLAINT       Chief Complaint   Patient presents with    Alcohol Intoxication     Drank half a gallon of vodka         HISTORY OF PRESENT ILLNESS  (Location/Symptom, Timing/Onset, Context/Setting, Quality, Duration, Modifying Factors, Severity.)   Ese Harding is a 29 y.o. female who presents to the emergency department via EMS. Family called EMS for a wellness check. Pt was home alone. EMS found the patient altered. She admitted to consuming 1/2 gallon of vodka. She has hx polysubstance abuse. She states she was given narcan yesterday. States she has not used fentanyl in one week. Pt appears intoxicated. Pt denies pain. HPI limited due to patient AMS, intoxication.      Nursing Notes were reviewed.    ALLERGIES     Ciprofloxacin    CURRENT MEDICATIONS       Previous Medications    BUPROPION (WELLBUTRIN XL) 150 MG EXTENDED RELEASE TABLET    Take 3 tablets by mouth every morning    CEPHALEXIN (KEFLEX) 500 MG CAPSULE    Take 1 capsule by mouth 2 times daily For 7 days    LINACLOTIDE (LINZESS) 145 MCG CAPSULE    Take 1 capsule by mouth every morning (before breakfast)    MAGNESIUM OXIDE (MAG-OX) 400 MG TABLET    Take 1 tablet by mouth daily    OLANZAPINE (ZYPREXA) 5 MG TABLET    Take 1 tablet by mouth nightly    OMEPRAZOLE (PRILOSEC) 20 MG DELAYED RELEASE CAPSULE    Take 1 capsule by mouth daily    PRAZOSIN (MINIPRESS) 1 MG CAPSULE    Take 1 capsule by mouth nightly    TRETINOIN (RETIN-A) 0.05 % CREAM    Apply topically nightly.       PAST MEDICAL HISTORY         Diagnosis Date    Hypokalemia     IBS (irritable bowel syndrome)        SURGICAL HISTORY     History reviewed. No pertinent surgical history.      FAMILY HISTORY     History reviewed. No pertinent family history.  No family status

## 2025-01-26 NOTE — ED PROVIDER NOTES
ADDENDUM:        Care of this patient was assumed from Dr. Salomon    at   0700   .  The patient was seen for Alcohol Intoxication (Drank half a gallon of vodka)  .  The patient's initial evaluation and plan have been discussed with the prior provider who initially evaluated the patient.  Nursing Notes, Past Medical Hx, Past Surgical Hx, Social Hx, Allergies, and Family Hx were all reviewed.      I performed a repeat evaluation of the patient and reviewed tests completed so far.    Patient presented status post drinking large amounts of alcohol.  On reassessment this morning patient complains of feeling shaky.  Patient reports she is a daily drinker.  CIWA score equals 8.  Benzodiazepine based alcohol withdrawal protocol ordered.  Patient will need to be admitted.  Case discussed with Dr. Rizvi at 07 47 he agrees to admit the patient.    ED Course     The patient was given the following medications:  Orders Placed This Encounter   Medications    FOLLOWED BY Linked Order Group     potassium chloride 10 mEq/100 mL IVPB (Peripheral Line)     potassium chloride 10 mEq/100 mL IVPB (Peripheral Line)    sodium chloride 0.9 % bolus 1,000 mL    lactated ringers bolus 1,000 mL    potassium bicarb-citric acid (EFFER-K) effervescent tablet 40 mEq    sodium chloride flush 0.9 % injection 5-40 mL    sodium chloride flush 0.9 % injection 5-40 mL    0.9 % sodium chloride infusion    thiamine mononitrate tablet 100 mg    OR Linked Order Group     LORazepam (ATIVAN) tablet 1 mg     LORazepam (ATIVAN) injection 1 mg     LORazepam (ATIVAN) tablet 2 mg     LORazepam (ATIVAN) injection 2 mg     LORazepam (ATIVAN) tablet 3 mg     LORazepam (ATIVAN) injection 3 mg     LORazepam (ATIVAN) tablet 4 mg     LORazepam (ATIVAN) injection 4 mg    sodium chloride 0.9 % bolus 1,000 mL       RECENT VITALS:  BP: 95/67, Temp: 97.9 °F (36.6 °C), Pulse: 74, Respirations: 19     RADIOLOGY:All plain film, CT, MRI, and formal ultrasound images (except ED

## 2025-01-26 NOTE — ED PROVIDER NOTES
eMERGENCY dEPARTMENT eNCOUnter   Independent Attestation     Pt Name: Ese Harding  MRN: [id number]  Birthdate 1995  Date of evaluation: 1/26/25     Ese Harding is a 29 y.o. female with CC: Alcohol Intoxication (Drank half a gallon of vodka)    Patient has history of alcohol abuse and reports that she was recently clean and sober stopped drinking however relapsed today.  Alcohol level significantly elevated at 400.  Hypokalemic 2.6 which is typical for patient.  No known trauma.  Awaiting sobriety and reassessment.    This visit was performed by both a physician and an APC. I performed all aspects of the MDM as documented.      FINAL IMPRESSION      1. Acute alcoholic intoxication without complication (HCC)    2. Hypokalemia    3. Alcohol withdrawal syndrome with complication (HCC)          DISPOSITION/PLAN   DISPOSITION Admitted 01/26/2025 07:48:36 AM               Gokul Salomon DO  Attending Emergency Physician         Gokul Salomon DO  01/26/25 1000

## 2025-01-26 NOTE — H&P
[facility]  Office: [phone]  Ignacio Marc DO, Howard Jaquez DO, Frank Mejía DO, Néstor Rizvi DO, Davina Ling MD, Melody Andrade MD, Elayne Alves MD, Pooja Younger MD,  Richard Rendon MD, Ousmane Almanza MD, Christiana Webb MD,  Elizabeth Cardona DO, Polina Hoskins MD, Shane Carranza MD, Tung Marc DO, Dominique Lowery MD,  Walter Benjamin DO, Damaris Dumont MD, Tara Verde MD, Keke Wu MD, Tayo Knox MD,  Tano Martinez MD, Cyndee Trivedi MD, Lilliam Reyes MD, Devon Michelle MD, Ty Martin MD, Alessandro Zaragoza MD, Juwan Downey DO, Adrian Garcia MD, Elizabeth Best MD, Mohsin Reza, MD, Shirley Waterhouse, CNP,  Lorrie Porter CNP, Juwan Pfeiffer, CNP,  Betty Couch, DNP, Afsaneh Trujillo, CNP, Senait Sena, CNP, Sharri Campbell, CNP, Shawanda Montejo, CNP, Darleen Mccauley, PA-C, Mariana Hemphill PA-C, Sophie Ponce, CNP, Levi Tovar, CNP,  Lulu Riley, CNP, Haven Dowling, CNP, Elaine Cheatham, CNP,  Johanna Sanders, CNS, Dena Stevens, CNP, Marisol Alicea, CNP,   Christie Yoon, CNP         [facility]   IN-PATIENT SERVICE   [facility]    HISTORY AND PHYSICAL EXAMINATION            Date:   1/26/2025  Patient name:  Ese Harding  Date of admission:  1/25/2025  9:02 PM  MRN:   [id number]  Account:  [id number]  YOB: 1995  PCP:    Wale Zhang MD  Room:   [address] [place] [address]  Code Status:    Full Code    Chief Complaint:     Chief Complaint   Patient presents with    Alcohol Intoxication     Drank half a gallon of vodka       History Obtained From:     patient, electronic medical record    History of Present Illness:     Ese Harding is a 29 y.o. Non- of /a female who presents with Alcohol Intoxication (Drank half a gallon of vodka)   and is admitted to the hospital for the management of Alcohol intoxication with blood level over 0.3 (HCC).    This 29 yof with severe alcohol use disorder presented to hospital because

## 2025-01-26 NOTE — ED NOTES
ED to inpatient nurses report      Chief Complaint:  Chief Complaint   Patient presents with    Alcohol Intoxication     Drank half a gallon of vodka     Present to ED from: Home    MOA:     LOC: alert and orientated to name and place  Mobility: Requires assistance * 1  Oxygen Baseline: None    Current needs required: none   Pending ED orders: Urine  Present condition: fair    Why did the patient come to the ED? Alcohol intoxication  What is the plan? Admit  Any procedures or intervention occur? no  Any safety concerns?? Fall Risk, Seizure Risk with Alcohol withdrawal.  CODE STATUS Prior  Diet No diet orders on file    Mental Status:       Psych Assessment:   Psychosocial  Psychosocial (WDL): Within Defined Limits  Vital signs   Vitals:    01/26/25 0600 01/26/25 0630 01/26/25 0730 01/26/25 0735   BP: (!) 86/56 (!) 87/56 95/67 95/67   Pulse: 81 85 85 74   Resp: 16 14 19    Temp:       TempSrc:       SpO2:       Weight:       Height:            Vitals:  Patient Vitals for the past 24 hrs:   BP Temp Temp src Pulse Resp SpO2 Height Weight   01/26/25 0735 95/67 -- -- 74 -- -- -- --   01/26/25 0730 95/67 -- -- 85 19 -- -- --   01/26/25 0630 (!) 87/56 -- -- 85 14 -- -- --   01/26/25 0600 (!) 86/56 -- -- 81 16 -- -- --   01/26/25 0530 (!) 82/56 -- -- 70 16 -- -- --   01/26/25 0430 (!) 84/60 -- -- 75 17 -- -- --   01/26/25 0400 (!) 87/58 -- -- 69 14 -- -- --   01/26/25 0330 (!) 86/52 -- -- 66 15 -- -- --   01/26/25 0226 94/65 -- -- 70 28 97 % -- --   01/26/25 0200 (!) 85/59 -- -- 86 17 -- -- --   01/26/25 0130 (!) 89/69 -- -- -- -- -- -- --   01/26/25 0100 91/65 -- -- 76 17 -- -- --   01/26/25 0030 92/64 -- -- 79 18 -- -- --   01/25/25 2330 93/71 -- -- 78 16 -- -- --   01/25/25 2300 95/71 -- -- 71 16 96 % -- --   01/25/25 2230 99/73 -- -- 87 14 -- -- --   01/25/25 2059 109/82 97.9 °F (36.6 °C) Oral 75 12 99 % -- --   01/25/25 2055 -- -- -- -- -- -- 1.803 m (5' 11\") 54.4 kg (120 lb)      Visit Vitals  BP 95/67   Pulse 74

## 2025-01-26 NOTE — CARE COORDINATION
Attempted to see patient for initial CM assessment.  Patient currently in CIWA protocol and received 1 mg Ativan at 1328, received 1 mg prior to that at 1204.    Per RN patient is requesting MI resources for Alcohol.  Will notify ADELINA Smith to follow up in the morning.

## 2025-01-27 VITALS
BODY MASS INDEX: 16.53 KG/M2 | HEIGHT: 71 IN | HEART RATE: 62 BPM | SYSTOLIC BLOOD PRESSURE: 86 MMHG | TEMPERATURE: 98.1 F | OXYGEN SATURATION: 97 % | RESPIRATION RATE: 16 BRPM | WEIGHT: 118.1 LBS | DIASTOLIC BLOOD PRESSURE: 63 MMHG

## 2025-01-27 LAB
ANION GAP SERPL CALCULATED.3IONS-SCNC: 9 MMOL/L (ref 9–16)
BUN SERPL-MCNC: 5 MG/DL (ref 6–20)
CALCIUM SERPL-MCNC: 8.5 MG/DL (ref 8.6–10.4)
CHLORIDE SERPL-SCNC: 98 MMOL/L (ref 98–107)
CO2 SERPL-SCNC: 28 MMOL/L (ref 20–31)
CREAT SERPL-MCNC: 0.8 MG/DL (ref 0.5–0.9)
GFR, ESTIMATED: >90 ML/MIN/1.73M2
GLUCOSE SERPL-MCNC: 121 MG/DL (ref 74–99)
MAGNESIUM SERPL-MCNC: 1.8 MG/DL (ref 1.6–2.6)
POTASSIUM SERPL-SCNC: 3.2 MMOL/L (ref 3.7–5.3)
SODIUM SERPL-SCNC: 136 MMOL/L (ref 136–145)

## 2025-01-27 PROCEDURE — 99232 SBSQ HOSP IP/OBS MODERATE 35: CPT | Performed by: INTERNAL MEDICINE

## 2025-01-27 PROCEDURE — 83735 ASSAY OF MAGNESIUM: CPT

## 2025-01-27 PROCEDURE — 6360000002 HC RX W HCPCS: Performed by: INTERNAL MEDICINE

## 2025-01-27 PROCEDURE — 2500000003 HC RX 250 WO HCPCS: Performed by: INTERNAL MEDICINE

## 2025-01-27 PROCEDURE — 36415 COLL VENOUS BLD VENIPUNCTURE: CPT

## 2025-01-27 PROCEDURE — 6370000000 HC RX 637 (ALT 250 FOR IP): Performed by: INTERNAL MEDICINE

## 2025-01-27 PROCEDURE — 80048 BASIC METABOLIC PNL TOTAL CA: CPT

## 2025-01-27 RX ORDER — MULTIVITAMIN WITH IRON
1 TABLET ORAL DAILY
Status: DISCONTINUED | OUTPATIENT
Start: 2025-01-27 | End: 2025-01-27 | Stop reason: HOSPADM

## 2025-01-27 RX ORDER — FOLIC ACID 1 MG/1
1 TABLET ORAL DAILY
Status: DISCONTINUED | OUTPATIENT
Start: 2025-01-27 | End: 2025-01-27 | Stop reason: HOSPADM

## 2025-01-27 RX ORDER — LORAZEPAM 2 MG/ML
1 INJECTION INTRAMUSCULAR EVERY 4 HOURS PRN
Status: DISCONTINUED | OUTPATIENT
Start: 2025-01-27 | End: 2025-01-27 | Stop reason: HOSPADM

## 2025-01-27 RX ADMIN — LORAZEPAM 1 MG: 2 INJECTION INTRAMUSCULAR; INTRAVENOUS at 13:43

## 2025-01-27 RX ADMIN — POTASSIUM CHLORIDE 40 MEQ: 1500 TABLET, EXTENDED RELEASE ORAL at 13:49

## 2025-01-27 RX ADMIN — SODIUM CHLORIDE, PRESERVATIVE FREE 10 ML: 5 INJECTION INTRAVENOUS at 19:35

## 2025-01-27 RX ADMIN — SODIUM CHLORIDE, PRESERVATIVE FREE 10 ML: 5 INJECTION INTRAVENOUS at 09:17

## 2025-01-27 RX ADMIN — LORAZEPAM 2 MG: 2 INJECTION INTRAMUSCULAR; INTRAVENOUS at 09:17

## 2025-01-27 RX ADMIN — OLANZAPINE 5 MG: 5 TABLET, FILM COATED ORAL at 19:35

## 2025-01-27 RX ADMIN — LORAZEPAM 1 MG: 2 INJECTION INTRAMUSCULAR; INTRAVENOUS at 17:40

## 2025-01-27 NOTE — PLAN OF CARE
Pt remains free from falls overnight. Pt is on a CIWA scale d/t detoxing. Pt did receive ativan twice my shift. Pt is quick and unsteady when getting up. Therefore, pt is a SWM.    Otherwise pt slept most of the night. Only got up twice when needing to use the restroom.     Pt refused morning labs. Requested they come back later.     Problem: Safety - Adult  Goal: Free from fall injury  Outcome: Progressing     Problem: Risk for Elopement  Goal: Patient will not exit the unit/facility without proper excort  Outcome: Progressing     Problem: Discharge Planning  Goal: Discharge to home or other facility with appropriate resources  Outcome: Progressing

## 2025-01-27 NOTE — PROGRESS NOTES
[facility]  Office: [phone]  Ignacio Marc DO, Howard Jaquez DO, Frank Mejía DO, Néstor Rizvi DO, Davina Ling MD, Melody Andrade MD, Elayne Alves MD, Pooja Younger MD,  Richard Rendon MD, Ousmane Almanza MD, Christiana Webb MD,  Elizabeth Cardona DO, Polina Hoskins MD, Shane Carranza MD, Tung Marc DO, Dominique Lowery MD,  Walter Benjamin DO, Damaris Dumont MD, Tara Verde MD, Keke Wu MD, Tayo Knox MD,  Tano Martinez MD, Cyndee Trivedi MD, Lilliam Reyes MD, Devon Michelle MD, Ty Martin MD, Alessandro Zaragoza MD, Juwan Downey DO, Adrian Garcia MD, Elizabeth Best MD, Mohsin Reza, MD, Shirley Waterhouse, CNP,  Lorrie Porter CNP, Juwan Pfeiffer, CNP,  Betty Couch, DNP, Afsaneh Trujillo, CNP, Senait Sena, CNP, Sharri Campbell, CNP, Shawanda Montejo, CNP, Darleen Mccauley, PA-C, Mariana Hemphill PA-C, Sophie Ponce, CNP, Levi Tovar, CNP,  Lulu Riley, CNP, Haven Dowling, CNP, Elaine Cheatham, CNP,  Johanna Sanders, CNS, Dena Stevens, CNP, Marisol Alicea, CNP,   Christie Yoon, CNP         [facility]   IN-PATIENT SERVICE   [facility]    Progress Note    1/27/2025    10:08 AM    Name:   Ese Harding  MRN:     [id number]     Acct:      [id number]   Room:   [facility]/1009-02   Day:  1  Admit Date:  1/25/2025  9:02 PM    PCP:   Wael Zhang MD  Code Status:  Full Code    Subjective:     C/C:   Chief Complaint   Patient presents with    Alcohol Intoxication     Drank half a gallon of vodka     Interval History Status: not changed.     States she does not feel well but does not appear to be in any distress at all    Refused labs this am    Refusing vitamins ordered and lovenox also    Requesting ativan frequently and rn yesterday stated patient knew the ciwa scale and what symptoms to say she had to get ativan    For me does not appear to be in withdrawal    Brief History:     Ese Harding is a 29 y.o. Non- of /a female who

## 2025-01-27 NOTE — PLAN OF CARE
Patient aox4. Very tearful today  CIWA discontinued and ativan ordered Q4 for withdrawal symptoms  Given alcohol cessation resources by case management  PO potassium replaced  Patient refusing all other meds at this time  Impulsive, does not call out appropriately. Bed alarm on at this time  VSS, call light within reach, safety maintained        Problem: Discharge Planning  Goal: Discharge to home or other facility with appropriate resources  1/27/2025 1756 by Reyes, Brittnie, RN  Outcome: Progressing     Problem: Safety - Adult  Goal: Free from fall injury  1/27/2025 1756 by Reyes, Brittnie, RN  Outcome: Progressing     Problem: Risk for Elopement  Goal: Patient will not exit the unit/facility without proper excort  1/27/2025 1756 by Reyes, Brittnie, RN  Outcome: Progressing

## 2025-01-27 NOTE — CARE COORDINATION
Case Management Assessment  Initial Evaluation    Date/Time of Evaluation: 1/27/2025 2:56 PM  Assessment Completed by: CARO LEMUS RN    If patient is discharged prior to next notation, then this note serves as note for discharge by case management.    Patient Name: Ese Harding                   YOB: 1995  Diagnosis: Hypokalemia [E87.6]  Alcohol intoxication with blood level over 0.3 (HCC) [F10.129]  Alcohol withdrawal syndrome with complication (HCC) [F10.939]  Acute alcoholic intoxication without complication (HCC) [F10.920]                   Date / Time: 1/25/2025  9:02 PM    Patient Admission Status: Inpatient   Readmission Risk (Low < 19, Mod (19-27), High > 27): Readmission Risk Score: 14.9    Current PCP: Wale Zhang MD  PCP verified by CM? No (responded \"long time\")    Chart Reviewed: Yes      History Provided by: Patient  Patient Orientation: Alert and Oriented (sleepy)    Patient Cognition: Alert    Hospitalization in the last 30 days (Readmission):  Yes    If yes, Readmission Assessment in CM Navigator will be completed.    Advance Directives:      Code Status: Full Code   Patient's Primary Decision Maker is: Legal Next of Kin      Discharge Planning:    Patient lives with: Spouse/Significant Other Type of Home: House  Primary Care Giver: Self  Patient Support Systems include: Family Members   Current Financial resources: Medicaid  Current community resources: [employer]  Current services prior to admission: None            Current DME:              Type of Home Care services:  None    ADLS  Prior functional level: Independent in ADLs/IADLs  Current functional level: Independent in ADLs/IADLs    PT AM-PAC:   /24  OT AM-PAC:   /24    Family can provide assistance at DC: No  Would you like Case Management to discuss the discharge plan with any other family members/significant others, and if so, who? No  Plans to Return to Present Housing: Yes  Other Identified

## 2025-01-27 NOTE — CARE COORDINATION
01/27/25 0930   Readmission Assessment   Number of Days since last admission? 8-30 days   Previous Disposition Home with Family   Who is being Interviewed Patient   What was the patient's/caregiver's perception as to why they think they needed to return back to the hospital? AMA discharge on prior admission   Did you visit your Primary Care Physician after you left the hospital, before you returned this time? No   Why weren't you able to visit your PCP? Did not have an appointment   Did you see a specialist, such as Cardiac, Pulmonary, Orthopedic Physician, etc. after you left the hospital? No   Who advised the patient to return to the hospital? Self-referral   Does the patient report anything that got in the way of taking their medications? No   In our efforts to provide the best possible care to you and others like you, can you think of anything that we could have done to help you after you left the hospital the first time, so that you might not have needed to return so soon? Other (Comment)  (none)

## 2025-01-28 LAB
EKG ATRIAL RATE: 72 BPM
EKG P AXIS: 79 DEGREES
EKG P-R INTERVAL: 148 MS
EKG Q-T INTERVAL: 448 MS
EKG QRS DURATION: 96 MS
EKG QTC CALCULATION (BAZETT): 490 MS
EKG R AXIS: 80 DEGREES
EKG T AXIS: 18 DEGREES
EKG VENTRICULAR RATE: 72 BPM

## 2025-01-28 PROCEDURE — 93010 ELECTROCARDIOGRAM REPORT: CPT | Performed by: INTERNAL MEDICINE
